# Patient Record
Sex: MALE | Race: WHITE | NOT HISPANIC OR LATINO | Employment: FULL TIME | URBAN - METROPOLITAN AREA
[De-identification: names, ages, dates, MRNs, and addresses within clinical notes are randomized per-mention and may not be internally consistent; named-entity substitution may affect disease eponyms.]

---

## 2018-01-30 ENCOUNTER — OFFICE VISIT (OUTPATIENT)
Dept: RETAIL CLINIC | Facility: CLINIC | Age: 48
End: 2018-01-30

## 2018-01-30 VITALS
HEART RATE: 79 BPM | OXYGEN SATURATION: 99 % | TEMPERATURE: 98.9 F | SYSTOLIC BLOOD PRESSURE: 110 MMHG | DIASTOLIC BLOOD PRESSURE: 72 MMHG | RESPIRATION RATE: 18 BRPM

## 2018-01-30 DIAGNOSIS — R50.9 FEVER, UNSPECIFIED FEVER CAUSE: ICD-10-CM

## 2018-01-30 DIAGNOSIS — R12 HEARTBURN: Primary | ICD-10-CM

## 2018-01-30 DIAGNOSIS — J06.9 ACUTE URI: ICD-10-CM

## 2018-01-30 LAB
EXPIRATION DATE: NORMAL
FLUAV AG NPH QL: NEGATIVE
FLUBV AG NPH QL: NEGATIVE
INTERNAL CONTROL: NORMAL
Lab: NORMAL

## 2018-01-30 PROCEDURE — 99203 OFFICE O/P NEW LOW 30 MIN: CPT | Performed by: NURSE PRACTITIONER

## 2018-01-30 PROCEDURE — 87804 INFLUENZA ASSAY W/OPTIC: CPT | Performed by: NURSE PRACTITIONER

## 2018-01-30 RX ORDER — OMEPRAZOLE 20 MG/1
20 CAPSULE, DELAYED RELEASE ORAL DAILY
Qty: 30 CAPSULE | Refills: 0 | Status: SHIPPED | OUTPATIENT
Start: 2018-01-30 | End: 2018-03-02

## 2018-01-30 NOTE — PATIENT INSTRUCTIONS
"Upper Respiratory Infection, Adult  Most upper respiratory infections (URIs) are a viral infection of the air passages leading to the lungs. A URI affects the nose, throat, and upper air passages. The most common type of URI is nasopharyngitis and is typically referred to as \"the common cold.\"  URIs run their course and usually go away on their own. Most of the time, a URI does not require medical attention, but sometimes a bacterial infection in the upper airways can follow a viral infection. This is called a secondary infection. Sinus and middle ear infections are common types of secondary upper respiratory infections.  Bacterial pneumonia can also complicate a URI. A URI can worsen asthma and chronic obstructive pulmonary disease (COPD). Sometimes, these complications can require emergency medical care and may be life threatening.  What are the causes?  Almost all URIs are caused by viruses. A virus is a type of germ and can spread from one person to another.  What increases the risk?  You may be at risk for a URI if:  · You smoke.  · You have chronic heart or lung disease.  · You have a weakened defense (immune) system.  · You are very young or very old.  · You have nasal allergies or asthma.  · You work in crowded or poorly ventilated areas.  · You work in health care facilities or schools.  What are the signs or symptoms?  Symptoms typically develop 2-3 days after you come in contact with a cold virus. Most viral URIs last 7-10 days. However, viral URIs from the influenza virus (flu virus) can last 14-18 days and are typically more severe. Symptoms may include:  · Runny or stuffy (congested) nose.  · Sneezing.  · Cough.  · Sore throat.  · Headache.  · Fatigue.  · Fever.  · Loss of appetite.  · Pain in your forehead, behind your eyes, and over your cheekbones (sinus pain).  · Muscle aches.  How is this diagnosed?  Your health care provider may diagnose a URI by:  · Physical exam.  · Tests to check that your " symptoms are not due to another condition such as:  ¨ Strep throat.  ¨ Sinusitis.  ¨ Pneumonia.  ¨ Asthma.  How is this treated?  A URI goes away on its own with time. It cannot be cured with medicines, but medicines may be prescribed or recommended to relieve symptoms. Medicines may help:  · Reduce your fever.  · Reduce your cough.  · Relieve nasal congestion.  Follow these instructions at home:  · Take medicines only as directed by your health care provider.  · Gargle warm saltwater or take cough drops to comfort your throat as directed by your health care provider.  · Use a warm mist humidifier or inhale steam from a shower to increase air moisture. This may make it easier to breathe.  · Drink enough fluid to keep your urine clear or pale yellow.  · Eat soups and other clear broths and maintain good nutrition.  · Rest as needed.  · Return to work when your temperature has returned to normal or as your health care provider advises. You may need to stay home longer to avoid infecting others. You can also use a face mask and careful hand washing to prevent spread of the virus.  · Increase the usage of your inhaler if you have asthma.  · Do not use any tobacco products, including cigarettes, chewing tobacco, or electronic cigarettes. If you need help quitting, ask your health care provider.  How is this prevented?  The best way to protect yourself from getting a cold is to practice good hygiene.  · Avoid oral or hand contact with people with cold symptoms.  · Wash your hands often if contact occurs.  There is no clear evidence that vitamin C, vitamin E, echinacea, or exercise reduces the chance of developing a cold. However, it is always recommended to get plenty of rest, exercise, and practice good nutrition.  Contact a health care provider if:  · You are getting worse rather than better.  · Your symptoms are not controlled by medicine.  · You have chills.  · You have worsening shortness of breath.  · You have brown  or red mucus.  · You have yellow or brown nasal discharge.  · You have pain in your face, especially when you bend forward.  · You have a fever.  · You have swollen neck glands.  · You have pain while swallowing.  · You have white areas in the back of your throat.  Get help right away if:  · You have severe or persistent:  ¨ Headache.  ¨ Ear pain.  ¨ Sinus pain.  ¨ Chest pain.  · You have chronic lung disease and any of the following:  ¨ Wheezing.  ¨ Prolonged cough.  ¨ Coughing up blood.  ¨ A change in your usual mucus.  · You have a stiff neck.  · You have changes in your:  ¨ Vision.  ¨ Hearing.  ¨ Thinking.  ¨ Mood.  This information is not intended to replace advice given to you by your health care provider. Make sure you discuss any questions you have with your health care provider.  Document Released: 06/13/2002 Document Revised: 08/20/2017 Document Reviewed: 03/25/2015  [employer] Patient Education © 2017 [employer]    Heartburn  Heartburn is a type of pain or discomfort that can happen in the throat or chest. It is often described as a burning pain. It may also cause a bad taste in the mouth. Heartburn may feel worse when you lie down or bend over, and it is often worse at night. Heartburn may be caused by stomach contents that move back up into the esophagus (reflux).  Follow these instructions at home:  Take these actions to decrease your discomfort and to help avoid complications.  Diet  · Follow a diet as recommended by your health care provider. This may involve avoiding foods and drinks such as:  ¨ Coffee and tea (with or without caffeine).  ¨ Drinks that contain alcohol.  ¨ Energy drinks and sports drinks.  ¨ Carbonated drinks or sodas.  ¨ Chocolate and cocoa.  ¨ Peppermint and mint flavorings.  ¨ Garlic and onions.  ¨ Horseradish.  ¨ Spicy and acidic foods, including peppers, chili powder, garcia powder, vinegar, hot sauces, and barbecue sauce.  ¨ Citrus fruit juices and citrus fruits,  such as oranges, dwain, and limes.  ¨ Tomato-based foods, such as red sauce, chili, salsa, and pizza with red sauce.  ¨ Fried and fatty foods, such as donuts, french fries, potato chips, and high-fat dressings.  ¨ High-fat meats, such as hot dogs and fatty cuts of red and white meats, such as rib eye steak, sausage, ham, and kraus.  ¨ High-fat dairy items, such as whole milk, butter, and cream cheese.  · Eat small, frequent meals instead of large meals.  · Avoid drinking large amounts of liquid with your meals.  · Avoid eating meals during the 2-3 hours before bedtime.  · Avoid lying down right after you eat.  · Do not exercise right after you eat.  General instructions  · Pay attention to any changes in your symptoms.  · Take over-the-counter and prescription medicines only as told by your health care provider. Do not take aspirin, ibuprofen, or other NSAIDs unless your health care provider told you to do so.  · Do not use any tobacco products, including cigarettes, chewing tobacco, and e-cigarettes. If you need help quitting, ask your health care provider.  · Wear loose-fitting clothing. Do not wear anything tight around your waist that causes pressure on your abdomen.  · Raise (elevate) the head of your bed about 6 inches (15 cm).  · Try to reduce your stress, such as with yoga or meditation. If you need help reducing stress, ask your health care provider.  · If you are overweight, reduce your weight to an amount that is healthy for you. Ask your health care provider for guidance about a safe weight loss goal.  · Keep all follow-up visits as told by your health care provider. This is important.  Contact a health care provider if:  · You have new symptoms.  · You have unexplained weight loss.  · You have difficulty swallowing, or it hurts to swallow.  · You have wheezing or a persistent cough.  · Your symptoms do not improve with treatment.  · You have frequent heartburn for more than two weeks.  Get help right  away if:  · You have pain in your arms, neck, jaw, teeth, or back.  · You feel sweaty, dizzy, or light-headed.  · You have chest pain or shortness of breath.  · You vomit and your vomit looks like blood or coffee grounds.  · Your stool is bloody or black.  This information is not intended to replace advice given to you by your health care provider. Make sure you discuss any questions you have with your health care provider.  Document Released: 05/06/2010 Document Revised: 05/25/2017 Document Reviewed: 04/13/2016  [employer] Interactive Patient Education © 2017 [employer]

## 2018-01-30 NOTE — PROGRESS NOTES
"Subjective   Naveed Sauceda is a 47 y.o. male.     HPI Comments: Has an appointment with PCP on 2/2/18 for the chronic cough and palpitations that he has been having over the past 2 months. He reports a history of reflux which worsens at night and sometimes requires him to prop himself up in bed at night which helps sx. He has intermittently taken omeprazole in 2 week courses which resolves sx but he has never tapered off and reports reflux sx returning shortly after 2 week course. He reports palpitations also keep him up at night and prior to this happening, he feels chest \"spasms\". He denies chest/jaw/arm pain, HA, diaphoresis, or dizziness. His acute complaints involve worsening cough, fever, aches, and red eyes over the last 3 days. His wife was diagnosed with influenza 2 days ago but is feeling better today. He didn't receive influenza vaccination this season.     Cough   This is a chronic problem. Episode onset: 2 months ago. The problem has been unchanged. The problem occurs hourly. The cough is non-productive. Associated symptoms include eye redness, a fever (tmax 101.3), headaches, heartburn, myalgias and a sore throat (intermittently over past 2 months). Pertinent negatives include no chest pain, chills, ear congestion, ear pain, hemoptysis, nasal congestion, postnasal drip, rhinorrhea, shortness of breath, sweats, weight loss or wheezing. The symptoms are aggravated by lying down. Treatments tried: ibuprofen, omeprazole. The treatment provided mild relief. His past medical history is significant for environmental allergies. There is no history of asthma, bronchitis, COPD or pneumonia.       The following portions of the patient's history were reviewed and updated as appropriate: allergies, current medications, past family history, past medical history, past social history, past surgical history and problem list.    Review of Systems   Constitutional: Positive for fatigue and fever (tmax 101.3). Negative " for appetite change, chills, diaphoresis and weight loss.   HENT: Positive for sore throat (intermittently over past 2 months). Negative for congestion, ear discharge, ear pain, facial swelling, hearing loss, mouth sores, nosebleeds, postnasal drip, rhinorrhea, sinus pain, sinus pressure, sneezing, trouble swallowing and voice change.    Eyes: Positive for discharge and redness. Negative for photophobia, pain, itching and visual disturbance.   Respiratory: Positive for cough. Negative for hemoptysis, chest tightness, shortness of breath, wheezing and stridor.    Cardiovascular: Positive for palpitations. Negative for chest pain and leg swelling.   Gastrointestinal: Positive for heartburn. Negative for abdominal distention, abdominal pain, blood in stool, constipation, diarrhea, nausea and vomiting.   Genitourinary: Negative for decreased urine volume.   Musculoskeletal: Positive for myalgias. Negative for neck pain and neck stiffness.   Skin: Negative for color change.   Allergic/Immunologic: Positive for environmental allergies. Negative for food allergies and immunocompromised state.   Neurological: Positive for headaches. Negative for dizziness, syncope and weakness.       Objective   Physical Exam   Constitutional: He is oriented to person, place, and time. He appears well-developed and well-nourished. He is cooperative.  Non-toxic appearance. He does not appear ill. No distress.   HENT:   Right Ear: Hearing, external ear and ear canal normal. Tympanic membrane is not perforated, not erythematous, not retracted and not bulging. A middle ear effusion is present.   Left Ear: Hearing, external ear and ear canal normal. Tympanic membrane is not perforated, not erythematous, not retracted and not bulging. A middle ear effusion is present.   Nose: Nose normal. No mucosal edema or rhinorrhea. Right sinus exhibits no maxillary sinus tenderness and no frontal sinus tenderness. Left sinus exhibits no maxillary sinus  tenderness and no frontal sinus tenderness.   Mouth/Throat: Uvula is midline, oropharynx is clear and moist and mucous membranes are normal. No oral lesions. No uvula swelling. No oropharyngeal exudate, posterior oropharyngeal edema or posterior oropharyngeal erythema. Tonsils are 2+ on the right. Tonsils are 2+ on the left. No tonsillar exudate.   Small amount of non obstructing, soft, yellow cerumen in bilateral canals   Eyes: EOM and lids are normal. Pupils are equal, round, and reactive to light. Right eye exhibits no discharge and no hordeolum. No foreign body present in the right eye. Left eye exhibits discharge (small amount in inner canthus). Left eye exhibits no hordeolum. No foreign body present in the left eye. Right conjunctiva is injected. Right conjunctiva has no hemorrhage. Left conjunctiva is injected. Left conjunctiva has no hemorrhage.   Cardiovascular: Normal rate, regular rhythm, S1 normal and S2 normal.    Pulmonary/Chest: Effort normal and breath sounds normal.   Abdominal: Bowel sounds are normal. There is no tenderness.   Lymphadenopathy:     He has no cervical adenopathy.   Neurological: He is alert and oriented to person, place, and time.   Skin: Skin is warm and dry. He is not diaphoretic.   Vitals reviewed.      Assessment/Plan   Naveed was seen today for cough.    Diagnoses and all orders for this visit:    Heartburn  -     omeprazole (PRILOSEC) 20 MG capsule; Take 1 capsule by mouth Daily for 30 days.    Fever, unspecified fever cause  -     POC Influenza A / B    Acute URI        -     Symptomatic care: rest, increase H2O intake, visine OTC for red eyes        -     Discussed food diary        -     Follow up with PCP on 2/2/18 at previously scheduled appointment-discussed emergency symptoms requiring follow up at ER    Lab Results (last 24 hours)     Procedure Component Value Units Date/Time    POC Influenza A / B [[id number]] Collected:  01/30/18 0842    Specimen:  Swab Updated:   01/30/18 0843     Rapid Influenza A Ag negative     Rapid Influenza B Ag negative     Internal Control Passed     Lot Number [id number]     Expiration Date 12/2019

## 2018-02-02 ENCOUNTER — OFFICE VISIT (OUTPATIENT)
Dept: FAMILY MEDICINE CLINIC | Facility: CLINIC | Age: 48
End: 2018-02-02

## 2018-02-02 VITALS
HEART RATE: 81 BPM | SYSTOLIC BLOOD PRESSURE: 118 MMHG | DIASTOLIC BLOOD PRESSURE: 68 MMHG | RESPIRATION RATE: 16 BRPM | BODY MASS INDEX: 25.76 KG/M2 | OXYGEN SATURATION: 99 % | WEIGHT: 184 LBS | HEIGHT: 71 IN | TEMPERATURE: 98.3 F

## 2018-02-02 DIAGNOSIS — R06.02 SHORTNESS OF BREATH: ICD-10-CM

## 2018-02-02 DIAGNOSIS — J06.9 ACUTE URI: ICD-10-CM

## 2018-02-02 DIAGNOSIS — R79.89 ABNORMAL THYROID BLOOD TEST: ICD-10-CM

## 2018-02-02 DIAGNOSIS — G47.30 OBSERVED SLEEP APNEA: ICD-10-CM

## 2018-02-02 DIAGNOSIS — R00.2 PALPITATIONS: ICD-10-CM

## 2018-02-02 DIAGNOSIS — R53.83 TIRED: Primary | ICD-10-CM

## 2018-02-02 DIAGNOSIS — R05.9 COUGH: ICD-10-CM

## 2018-02-02 DIAGNOSIS — D72.9 ABNORMAL WBC COUNT: ICD-10-CM

## 2018-02-02 DIAGNOSIS — R79.89 LFT ELEVATION: ICD-10-CM

## 2018-02-02 DIAGNOSIS — Z00.00 LABORATORY EXAMINATION ORDERED AS PART OF A ROUTINE GENERAL MEDICAL EXAMINATION: ICD-10-CM

## 2018-02-02 PROBLEM — K21.9 ACID REFLUX: Status: ACTIVE | Noted: 2018-02-02

## 2018-02-02 PROCEDURE — 99204 OFFICE O/P NEW MOD 45 MIN: CPT | Performed by: PHYSICIAN ASSISTANT

## 2018-02-02 PROCEDURE — 71046 X-RAY EXAM CHEST 2 VIEWS: CPT | Performed by: PHYSICIAN ASSISTANT

## 2018-02-02 RX ORDER — AZITHROMYCIN 250 MG/1
TABLET, FILM COATED ORAL
Qty: 6 TABLET | Refills: 1 | Status: SHIPPED | OUTPATIENT
Start: 2018-02-02 | End: 2018-02-13 | Stop reason: ALTCHOICE

## 2018-02-02 NOTE — PATIENT INSTRUCTIONS

## 2018-02-02 NOTE — PROGRESS NOTES
Subjective   Naveed Sauceda is a 47 y.o. male.     History of Present Illness     Naveed Sauceda 47 y.o. male who presents today for a new patient appointment.    he has a history of   Patient Active Problem List   Diagnosis   • Acid reflux   .  he is here to re-establish care I reviewed the PFSH recorded today by my MA/LPN staff.   he   He has been feeling fairly well.    X-Ray  Interpretation report in house X-rays that I personally viewed    Relevant Clinical Issues/Diagnoses/Indications:  Cough for months        Clinical Findings:  Calcifications in aorta X 2; DDD changes spine          Comparative Data:  none           Date of Previous X-ray:  Change on current X-ray    Cough non productive since Nov; dry cough;  Worse laying down and NOT worse exerting;  Some exertional SOA as well;  Productive cough with illness onset for over 1 1/2 weeks  He did restart come to [facility] and had him restart PPI and see if causing cough.  Plan CXR, labs, cardio consult  Has few times a week of waking up with gasping for air and will need sleep study  Palpitations do not wake him up  Will elevated HOB    Patient complains of palpitations, shortness of breath and a skipping sensation. The symptoms are mild, occur intermittently and every hour for last few months; not worse; no coupled beats that he can feel.   and last seconds per episode. They tend to occur while at rest. Cardiac risk factors include: male gender. Aggravating factors are stress/anxiety:  Alleviating factors: not noted and just goes away. Associated symptoms: none. Patient denies: dizziness and leg swelling.      Will avoid caffeine and decongestants   The following portions of the patient's history were reviewed and updated as appropriate: allergies, current medications, past family history, past medical history, past social history, past surgical history and problem list.    Review of Systems   Constitutional: Positive for fatigue. Negative for activity change,  appetite change and unexpected weight change.   HENT: Positive for congestion, postnasal drip and voice change. Negative for nosebleeds and trouble swallowing.    Eyes: Positive for redness. Negative for pain and visual disturbance.   Respiratory: Negative for chest tightness, shortness of breath and wheezing.    Cardiovascular: Positive for palpitations. Negative for chest pain.   Gastrointestinal: Negative for abdominal pain and blood in stool.   Endocrine: Negative.    Genitourinary: Negative for difficulty urinating and hematuria.   Musculoskeletal: Negative for joint swelling.   Skin: Negative for color change and rash.   Allergic/Immunologic: Negative.    Neurological: Negative for syncope and speech difficulty.   Hematological: Negative for adenopathy.   Psychiatric/Behavioral: Negative for agitation and confusion.   All other systems reviewed and are negative.      Objective   Physical Exam   Constitutional: He is oriented to person, place, and time. He appears well-developed and well-nourished. No distress.   HENT:   Head: Normocephalic and atraumatic.   Eyes: Conjunctivae and EOM are normal. Pupils are equal, round, and reactive to light. Right eye exhibits no discharge. Left eye exhibits no discharge. No scleral icterus.   Neck: Normal range of motion. Neck supple. No tracheal deviation present. No thyromegaly present.   Cardiovascular: Normal rate, regular rhythm, normal heart sounds, intact distal pulses and normal pulses.  Exam reveals no gallop.    No murmur heard.  Pulmonary/Chest: Effort normal and breath sounds normal. No respiratory distress. He has no wheezes. He has no rales.   Musculoskeletal: Normal range of motion.   Neurological: He is alert and oriented to person, place, and time. He exhibits normal muscle tone. Coordination normal.   Skin: Skin is warm. No rash noted. No erythema. No pallor.   Psychiatric: He has a normal mood and affect. His behavior is normal. Judgment and thought  content normal.   Nursing note and vitals reviewed.      Assessment/Plan   Naveed was seen today for establish care.    Diagnoses and all orders for this visit:    Tired    Palpitations    Shortness of breath    Observed sleep apnea

## 2018-02-03 LAB
25(OH)D3+25(OH)D2 SERPL-MCNC: 41.3 NG/ML (ref 30–100)
ALBUMIN SERPL-MCNC: 4.4 G/DL (ref 3.5–5.2)
ALBUMIN/GLOB SERPL: 1.4 G/DL
ALP SERPL-CCNC: 129 U/L (ref 39–117)
ALT SERPL-CCNC: 28 U/L (ref 1–41)
AST SERPL-CCNC: 21 U/L (ref 1–40)
BASOPHILS # BLD AUTO: 0.06 10*3/MM3 (ref 0–0.2)
BASOPHILS NFR BLD AUTO: 0.5 % (ref 0–1.5)
BILIRUB SERPL-MCNC: 0.2 MG/DL (ref 0.1–1.2)
BUN SERPL-MCNC: 15 MG/DL (ref 6–20)
BUN/CREAT SERPL: 15.2 (ref 7–25)
CALCIUM SERPL-MCNC: 9.6 MG/DL (ref 8.6–10.5)
CHLORIDE SERPL-SCNC: 102 MMOL/L (ref 98–107)
CHOLEST SERPL-MCNC: 144 MG/DL (ref 0–200)
CO2 SERPL-SCNC: 26.4 MMOL/L (ref 22–29)
CREAT SERPL-MCNC: 0.99 MG/DL (ref 0.76–1.27)
EOSINOPHIL # BLD AUTO: 0.23 10*3/MM3 (ref 0–0.7)
EOSINOPHIL NFR BLD AUTO: 2.1 % (ref 0.3–6.2)
ERYTHROCYTE [DISTWIDTH] IN BLOOD BY AUTOMATED COUNT: 12.6 % (ref 11.5–14.5)
GFR SERPLBLD CREATININE-BSD FMLA CKD-EPI: 81 ML/MIN/1.73
GFR SERPLBLD CREATININE-BSD FMLA CKD-EPI: 98 ML/MIN/1.73
GLOBULIN SER CALC-MCNC: 3.2 GM/DL
GLUCOSE SERPL-MCNC: 94 MG/DL (ref 65–99)
HCT VFR BLD AUTO: 49.2 % (ref 40.4–52.2)
HDLC SERPL-MCNC: 42 MG/DL (ref 40–60)
HGB BLD-MCNC: 16.7 G/DL (ref 13.7–17.6)
IMM GRANULOCYTES # BLD: 0.05 10*3/MM3 (ref 0–0.03)
IMM GRANULOCYTES NFR BLD: 0.4 % (ref 0–0.5)
LDLC SERPL CALC-MCNC: 89 MG/DL (ref 0–100)
LYMPHOCYTES # BLD AUTO: 2.45 10*3/MM3 (ref 0.9–4.8)
LYMPHOCYTES NFR BLD AUTO: 22 % (ref 19.6–45.3)
MCH RBC QN AUTO: 31.6 PG (ref 27–32.7)
MCHC RBC AUTO-ENTMCNC: 33.9 G/DL (ref 32.6–36.4)
MCV RBC AUTO: 93 FL (ref 79.8–96.2)
MONOCYTES # BLD AUTO: 1.26 10*3/MM3 (ref 0.2–1.2)
MONOCYTES NFR BLD AUTO: 11.3 % (ref 5–12)
NEUTROPHILS # BLD AUTO: 7.07 10*3/MM3 (ref 1.9–8.1)
NEUTROPHILS NFR BLD AUTO: 63.7 % (ref 42.7–76)
PLATELET # BLD AUTO: 287 10*3/MM3 (ref 140–500)
POTASSIUM SERPL-SCNC: 4.4 MMOL/L (ref 3.5–5.2)
PROT SERPL-MCNC: 7.6 G/DL (ref 6–8.5)
RBC # BLD AUTO: 5.29 10*6/MM3 (ref 4.6–6)
SODIUM SERPL-SCNC: 143 MMOL/L (ref 136–145)
T3FREE SERPL-MCNC: 4.6 PG/ML (ref 2–4.4)
T4 FREE SERPL-MCNC: 1.44 NG/DL (ref 0.93–1.7)
TRIGL SERPL-MCNC: 63 MG/DL (ref 0–150)
TSH SERPL DL<=0.005 MIU/L-ACNC: 1.09 MIU/ML (ref 0.27–4.2)
VLDLC SERPL CALC-MCNC: 12.6 MG/DL (ref 5–40)
WBC # BLD AUTO: 11.12 10*3/MM3 (ref 4.5–10.7)

## 2018-02-13 ENCOUNTER — OFFICE VISIT (OUTPATIENT)
Dept: CARDIOLOGY | Facility: CLINIC | Age: 48
End: 2018-02-13

## 2018-02-13 VITALS
SYSTOLIC BLOOD PRESSURE: 122 MMHG | DIASTOLIC BLOOD PRESSURE: 80 MMHG | HEART RATE: 83 BPM | WEIGHT: 162 LBS | HEIGHT: 71 IN | BODY MASS INDEX: 22.68 KG/M2

## 2018-02-13 DIAGNOSIS — R00.2 PALPITATIONS: Primary | ICD-10-CM

## 2018-02-13 DIAGNOSIS — R06.02 SHORTNESS OF BREATH: ICD-10-CM

## 2018-02-13 DIAGNOSIS — I45.10 RBBB: ICD-10-CM

## 2018-02-13 PROCEDURE — 93000 ELECTROCARDIOGRAM COMPLETE: CPT | Performed by: INTERNAL MEDICINE

## 2018-02-13 PROCEDURE — 99204 OFFICE O/P NEW MOD 45 MIN: CPT | Performed by: INTERNAL MEDICINE

## 2018-02-13 NOTE — PROGRESS NOTES
Subjective:     Encounter Date:02/13/2018      Patient ID: Naveed Sauceda is a 47 y.o. male.    Chief Complaint:  History of Present Illness    This is a 47-year-old with no significant past medical history presents for evaluation of palpitations.  Patient reports that he notices symptoms about 6 months ago.  The frequency of the symptoms has been pretty stable since that time.  He describes it as skipped heartbeats occurring usually about one at a time but occurs on a daily basis sometimes several times a day.  Symptoms can worsen with increased stress.  He does not arrest report any other associated aggravating factors.  He denies any worsening palpitations with exercise.  He denies any chest pain, PND or orthopnea, presyncope or syncope, or lower extremity edema.  He does report over that same period of time that is noticed that he is a little bit more short of breath with his routine activity at work.  There is some concern for sleep apnea and he is scheduled to undergo a sleep study next week.  He denies any family history of cardiac disease.    Review of Systems   Constitution: Negative for weakness and malaise/fatigue.   HENT: Negative for hearing loss, hoarse voice, nosebleeds and sore throat.    Eyes: Negative for pain.   Cardiovascular: Positive for dyspnea on exertion and palpitations. Negative for chest pain, claudication, cyanosis, irregular heartbeat, leg swelling, near-syncope, orthopnea, paroxysmal nocturnal dyspnea and syncope.   Respiratory: Negative for shortness of breath and snoring.    Endocrine: Negative for cold intolerance, heat intolerance, polydipsia, polyphagia and polyuria.   Skin: Negative for itching and rash.   Musculoskeletal: Negative for arthritis, falls, joint pain, joint swelling, muscle cramps, muscle weakness and myalgias.   Gastrointestinal: Negative for constipation, diarrhea, dysphagia, heartburn, hematemesis, hematochezia, melena, nausea and vomiting.   Genitourinary:  "Negative for frequency, hematuria and hesitancy.   Neurological: Negative for excessive daytime sleepiness, dizziness, headaches, light-headedness and numbness.   Psychiatric/Behavioral: Negative for depression. The patient is not nervous/anxious.           Current Outpatient Prescriptions:   •  omeprazole (PRILOSEC) 20 MG capsule, Take 1 capsule by mouth Daily for 30 days., Disp: 30 capsule, Rfl: 0    Past Medical History:   Diagnosis Date   • Acid reflux    • Allergic    • Kidney stone    • Observed sleep apnea    • Palpitations    • Shortness of breath    • Tired      Past Surgical History:   Procedure Laterality Date   • CYSTOSCOPY W/ LASER LITHOTRIPSY       History reviewed. No pertinent family history.  Social History   Substance Use Topics   • Smoking status: Never Smoker   • Smokeless tobacco: Never Used   • Alcohol use Yes      Comment: rarely           ECG 12 Lead  Date/Time: 2/13/2018 10:35 AM  Performed by: HANDY RUBIO  Authorized by: HANDY RUBIO   Comparison: not compared with previous ECG   Previous ECG: no previous ECG available  Rhythm: sinus rhythm  Conduction: right bundle branch block  QRS axis: left                 Objective:         Visit Vitals   • /80 (BP Location: Right arm, Patient Position: Sitting)   • Pulse 83   • Ht 180.3 cm (71\")   • Wt 73.5 kg (162 lb)   • BMI 22.59 kg/m2          Physical Exam   Constitutional: He is oriented to person, place, and time. He appears well-developed and well-nourished.   HENT:   Head: Normocephalic and atraumatic.   Eyes: Conjunctivae, EOM and lids are normal. Pupils are equal, round, and reactive to light.   Neck: Normal range of motion and full passive range of motion without pain. Neck supple. No JVD present. Carotid bruit is not present.   Cardiovascular: Normal rate, regular rhythm, S1 normal and S2 normal.  Exam reveals no gallop.    No murmur heard.  Pulses:       Radial pulses are 2+ on the right side, and 2+ on the left side.   No " bilateral lower extremity edema   Pulmonary/Chest: Effort normal and breath sounds normal.   Abdominal: Soft. Normal appearance.   Lymphadenopathy:     He has no cervical adenopathy.   Neurological: He is alert and oriented to person, place, and time.   Skin: Skin is warm, dry and intact.   Psychiatric: He has a normal mood and affect.       Lab Review:       Assessment:          Diagnosis Plan   1. Palpitations  Holter Monitor - 24 Hour    Adult Transthoracic Echo Complete W/ Cont if Necessary Per Protocol   2. Shortness of breath  Adult Transthoracic Echo Complete W/ Cont if Necessary Per Protocol   3. RBBB            Plan:         1.  Palpitations.  His symptoms sound suspicious for ectopy either ventricular or atrial.  We'll get him set up for a 24-hour Holter monitor since his symptoms occur on a daily basis.  Due to his symptoms, some mild dyspnea on exertion, and right bundle branch block noted on his baseline EKG we'll get him set up for an echocardiogram to evaluate for structural heart disease.  2.  Suspected sleep apnea.  Agree with proceeding with sleep study since this could be causing his palpitations and could be responsible for his right bundle branch block.  3.  Right bundle branch block    We'll contact us results of Holter monitor and the echocardiogram and determine further workup, management, and follow-up based on those results.

## 2018-02-15 NOTE — PROGRESS NOTES
Called and discussed holter monitor results with patient.  He would like hold off on echo for now.  Has sleep study next week.  He will call if he has further issues.

## 2018-02-19 ENCOUNTER — TRANSCRIBE ORDERS (OUTPATIENT)
Dept: SLEEP MEDICINE | Facility: HOSPITAL | Age: 48
End: 2018-02-19

## 2018-02-19 ENCOUNTER — OFFICE VISIT (OUTPATIENT)
Dept: SLEEP MEDICINE | Facility: HOSPITAL | Age: 48
End: 2018-02-19
Attending: INTERNAL MEDICINE

## 2018-02-19 VITALS
BODY MASS INDEX: 22.96 KG/M2 | DIASTOLIC BLOOD PRESSURE: 67 MMHG | SYSTOLIC BLOOD PRESSURE: 117 MMHG | WEIGHT: 164 LBS | HEIGHT: 71 IN | HEART RATE: 81 BPM

## 2018-02-19 DIAGNOSIS — G47.10 HYPERSOMNIA: Primary | ICD-10-CM

## 2018-02-19 DIAGNOSIS — G47.33 OSA (OBSTRUCTIVE SLEEP APNEA): Primary | ICD-10-CM

## 2018-02-19 PROCEDURE — G0463 HOSPITAL OUTPT CLINIC VISIT: HCPCS

## 2018-03-02 ENCOUNTER — OFFICE VISIT (OUTPATIENT)
Dept: FAMILY MEDICINE CLINIC | Facility: CLINIC | Age: 48
End: 2018-03-02

## 2018-03-02 VITALS
BODY MASS INDEX: 23.1 KG/M2 | TEMPERATURE: 98.4 F | OXYGEN SATURATION: 99 % | WEIGHT: 165 LBS | HEIGHT: 71 IN | HEART RATE: 82 BPM | RESPIRATION RATE: 16 BRPM | SYSTOLIC BLOOD PRESSURE: 110 MMHG | DIASTOLIC BLOOD PRESSURE: 70 MMHG

## 2018-03-02 DIAGNOSIS — K21.9 GASTROESOPHAGEAL REFLUX DISEASE WITHOUT ESOPHAGITIS: ICD-10-CM

## 2018-03-02 DIAGNOSIS — R53.83 TIRED: ICD-10-CM

## 2018-03-02 DIAGNOSIS — R06.02 SHORTNESS OF BREATH: ICD-10-CM

## 2018-03-02 DIAGNOSIS — D72.829 LEUKOCYTOSIS, UNSPECIFIED TYPE: ICD-10-CM

## 2018-03-02 DIAGNOSIS — R79.89 LFT ELEVATION: Primary | ICD-10-CM

## 2018-03-02 DIAGNOSIS — R79.89 ABNORMAL THYROID BLOOD TEST: ICD-10-CM

## 2018-03-02 PROCEDURE — 99213 OFFICE O/P EST LOW 20 MIN: CPT | Performed by: PHYSICIAN ASSISTANT

## 2018-03-02 NOTE — PATIENT INSTRUCTIONS
F/u allergist  Get sleep study  Get labs  Low glycemic index diet  Exercise 30 minutes most days of the week  Make sure you get results on any labs or tests we ordered today  We discussed medications and how to take them as prescribed  Sleep 6-8 hours each night if possible  If you have not signed up for [employer], please activate your code ASAP from your After Visit Summary today    LDL goal <100  LDL goal if heart disease <70  HDL goal >60  Triglyceride goal <150  BP goal =<130/80  Fasting glucose <100

## 2018-03-02 NOTE — PROGRESS NOTES
Subjective   Naveed Sauceda is a 47 y.o. male.     History of Present Illness   Naveed Sauceda 47 y.o. male who presents today for f/u from last visit for cough, fatigue, exertional SOA, sensation of palpitations.  Had him see cardio and neg work up with DR Anderson.  He is seeing Dr Neil for allergy; and did cough variant asthma;  He is still on PPI and in steroid inhaler now.  Cough is 50% better;  Sleep study is in process.  he has a history of   Patient Active Problem List   Diagnosis   • Acid reflux   • Palpitations   • Shortness of breath   • RBBB   .    PPI is effective    Had CXR and d/w Dr Mendieta with one view discoid atelectasis; no mass    Naveed Sauceda 47 y.o. male who presents today for routine follow up check and medication refills.  he has a history of   Patient Active Problem List   Diagnosis   • Acid reflux   • Palpitations   • Shortness of breath   • RBBB   .  Since the last visit, he has overall felt tired.  He has GERD and is well controlled on PPI medication.  he has been compliant with current medications have reviewed them.  The patient denies medication side effects.  He still has sensation of extra heart beat and this can be sleep apnea r/t;  Dr Anderson did work up and neg    Results for orders placed or performed in visit on 02/02/18   Comprehensive metabolic panel   Result Value Ref Range    Glucose 94 65 - 99 mg/dL    BUN 15 6 - 20 mg/dL    Creatinine 0.99 0.76 - 1.27 mg/dL    eGFR Non African Am 81 >60 mL/min/1.73    eGFR African Am 98 >60 mL/min/1.73    BUN/Creatinine Ratio 15.2 7.0 - 25.0    Sodium 143 136 - 145 mmol/L    Potassium 4.4 3.5 - 5.2 mmol/L    Chloride 102 98 - 107 mmol/L    Total CO2 26.4 22.0 - 29.0 mmol/L    Calcium 9.6 8.6 - 10.5 mg/dL    Total Protein 7.6 6.0 - 8.5 g/dL    Albumin 4.40 3.50 - 5.20 g/dL    Globulin 3.2 gm/dL    A/G Ratio 1.4 g/dL    Total Bilirubin 0.2 0.1 - 1.2 mg/dL    Alkaline Phosphatase 129 (H) 39 - 117 U/L    AST (SGOT) 21 1 - 40 U/L    ALT (SGPT) 28 1  - 41 U/L   Lipid panel   Result Value Ref Range    Total Cholesterol 144 0 - 200 mg/dL    Triglycerides 63 0 - 150 mg/dL    HDL Cholesterol 42 40 - 60 mg/dL    VLDL Cholesterol 12.6 5 - 40 mg/dL    LDL Cholesterol  89 0 - 100 mg/dL   TSH   Result Value Ref Range    TSH 1.090 0.270 - 4.200 mIU/mL   T4, Free   Result Value Ref Range    Free T4 1.44 0.93 - 1.70 ng/dL   Vitamin D 25 Hydroxy   Result Value Ref Range    25 Hydroxy, Vitamin D 41.3 30.0 - 100.0 ng/mL   T3, Free   Result Value Ref Range    T3, Free 4.6 (H) 2.0 - 4.4 pg/mL   CBC and Differential   Result Value Ref Range    WBC 11.12 (H) 4.50 - 10.70 10*3/mm3    RBC 5.29 4.60 - 6.00 10*6/mm3    Hemoglobin 16.7 13.7 - 17.6 g/dL    Hematocrit 49.2 40.4 - 52.2 %    MCV 93.0 79.8 - 96.2 fL    MCH 31.6 27.0 - 32.7 pg    MCHC 33.9 32.6 - 36.4 g/dL    RDW 12.6 11.5 - 14.5 %    Platelets 287 140 - 500 10*3/mm3    Neutrophil Rel % 63.7 42.7 - 76.0 %    Lymphocyte Rel % 22.0 19.6 - 45.3 %    Monocyte Rel % 11.3 5.0 - 12.0 %    Eosinophil Rel % 2.1 0.3 - 6.2 %    Basophil Rel % 0.5 0.0 - 1.5 %    Neutrophils Absolute 7.07 1.90 - 8.10 10*3/mm3    Lymphocytes Absolute 2.45 0.90 - 4.80 10*3/mm3    Monocytes Absolute 1.26 (H) 0.20 - 1.20 10*3/mm3    Eosinophils Absolute 0.23 0.00 - 0.70 10*3/mm3    Basophils Absolute 0.06 0.00 - 0.20 10*3/mm3    Immature Granulocyte Rel % 0.4 0.0 - 0.5 %    Immature Grans Absolute 0.05 (H) 0.00 - 0.03 10*3/mm3       I need the f/u labs for the borderline free T 3; the alk phos, WBC in 11s    The following portions of the patient's history were reviewed and updated as appropriate: allergies, current medications, past family history, past medical history, past social history, past surgical history and problem list.    Review of Systems   Constitutional: Negative for activity change, appetite change and unexpected weight change.   HENT: Negative for nosebleeds and trouble swallowing.    Eyes: Negative for pain and visual disturbance.    Respiratory: Positive for cough. Negative for chest tightness, shortness of breath and wheezing.    Cardiovascular: Negative for chest pain and palpitations.   Gastrointestinal: Negative for abdominal pain and blood in stool.   Endocrine: Negative.    Genitourinary: Negative for difficulty urinating and hematuria.   Musculoskeletal: Negative for joint swelling.   Skin: Negative for color change and rash.   Allergic/Immunologic: Negative.    Neurological: Negative for syncope and speech difficulty.   Hematological: Negative for adenopathy.   Psychiatric/Behavioral: Negative for agitation and confusion.   All other systems reviewed and are negative.      Objective   Physical Exam   Constitutional: He is oriented to person, place, and time. He appears well-developed and well-nourished. No distress.   HENT:   Head: Normocephalic and atraumatic.   Eyes: Conjunctivae and EOM are normal. Pupils are equal, round, and reactive to light. Right eye exhibits no discharge. Left eye exhibits no discharge. No scleral icterus.   Neck: Normal range of motion. Neck supple. No tracheal deviation present. No thyromegaly present.   Cardiovascular: Normal rate, regular rhythm, normal heart sounds, intact distal pulses and normal pulses.  Exam reveals no gallop.    No murmur heard.  Pulmonary/Chest: Effort normal and breath sounds normal. No respiratory distress. He has no wheezes. He has no rales.   Musculoskeletal: Normal range of motion.   Neurological: He is alert and oriented to person, place, and time. He exhibits normal muscle tone. Coordination normal.   Skin: Skin is warm. No rash noted. No erythema. No pallor.   Psychiatric: He has a normal mood and affect. His behavior is normal. Judgment and thought content normal.   Nursing note and vitals reviewed.      Assessment/Plan   Naveed was seen today for follow-up and fatigue.    Diagnoses and all orders for this visit:    LFT elevation    Tired    Leukocytosis, unspecified  type    Gastroesophageal reflux disease without esophagitis    Abnormal thyroid blood test    Shortness of breath    Other orders  -     Fluticasone Furoate (ARNUITY ELLIPTA) 100 MCG/ACT aerosol powder ; Inhale 100 mcg Daily.

## 2018-03-03 LAB
ALBUMIN SERPL-MCNC: 4.5 G/DL (ref 3.5–5.2)
ALP SERPL-CCNC: 111 U/L (ref 39–117)
ALT SERPL-CCNC: 36 U/L (ref 1–41)
AST SERPL-CCNC: 18 U/L (ref 1–40)
BASOPHILS # BLD AUTO: 0.04 10*3/MM3 (ref 0–0.2)
BASOPHILS NFR BLD AUTO: 0.4 % (ref 0–1.5)
BILIRUB DIRECT SERPL-MCNC: <0.2 MG/DL (ref 0–0.3)
BILIRUB SERPL-MCNC: 0.2 MG/DL (ref 0.1–1.2)
EOSINOPHIL # BLD AUTO: 0.13 10*3/MM3 (ref 0–0.7)
EOSINOPHIL NFR BLD AUTO: 1.4 % (ref 0.3–6.2)
ERYTHROCYTE [DISTWIDTH] IN BLOOD BY AUTOMATED COUNT: 12.9 % (ref 11.5–14.5)
HCT VFR BLD AUTO: 49.5 % (ref 40.4–52.2)
HGB BLD-MCNC: 16.6 G/DL (ref 13.7–17.6)
IMM GRANULOCYTES # BLD: 0.02 10*3/MM3 (ref 0–0.03)
IMM GRANULOCYTES NFR BLD: 0.2 % (ref 0–0.5)
LYMPHOCYTES # BLD AUTO: 2.28 10*3/MM3 (ref 0.9–4.8)
LYMPHOCYTES NFR BLD AUTO: 25.4 % (ref 19.6–45.3)
MCH RBC QN AUTO: 31.4 PG (ref 27–32.7)
MCHC RBC AUTO-ENTMCNC: 33.5 G/DL (ref 32.6–36.4)
MCV RBC AUTO: 93.6 FL (ref 79.8–96.2)
MONOCYTES # BLD AUTO: 1.06 10*3/MM3 (ref 0.2–1.2)
MONOCYTES NFR BLD AUTO: 11.8 % (ref 5–12)
NEUTROPHILS # BLD AUTO: 5.45 10*3/MM3 (ref 1.9–8.1)
NEUTROPHILS NFR BLD AUTO: 60.8 % (ref 42.7–76)
PLATELET # BLD AUTO: 245 10*3/MM3 (ref 140–500)
RBC # BLD AUTO: 5.29 10*6/MM3 (ref 4.6–6)
T3FREE SERPL-MCNC: 4 PG/ML (ref 2–4.4)
WBC # BLD AUTO: 8.98 10*3/MM3 (ref 4.5–10.7)

## 2018-03-06 ENCOUNTER — HOSPITAL ENCOUNTER (OUTPATIENT)
Dept: SLEEP MEDICINE | Facility: HOSPITAL | Age: 48
Discharge: HOME | End: 2018-03-06
Admitting: PHYSICIAN ASSISTANT

## 2018-03-06 DIAGNOSIS — G47.33 OSA (OBSTRUCTIVE SLEEP APNEA): ICD-10-CM

## 2018-03-06 PROCEDURE — 95806 SLEEP STUDY UNATT&RESP EFFT: CPT

## 2018-03-14 RX ORDER — OMEPRAZOLE 20 MG/1
20 CAPSULE, DELAYED RELEASE ORAL DAILY
Qty: 90 CAPSULE | Refills: 3 | Status: SHIPPED | OUTPATIENT
Start: 2018-03-14 | End: 2022-05-05

## 2018-03-20 ENCOUNTER — TELEPHONE (OUTPATIENT)
Dept: SLEEP MEDICINE | Facility: HOSPITAL | Age: 48
End: 2018-03-20

## 2018-04-06 ENCOUNTER — OFFICE VISIT (OUTPATIENT)
Dept: FAMILY MEDICINE CLINIC | Facility: CLINIC | Age: 48
End: 2018-04-06

## 2018-04-06 VITALS
BODY MASS INDEX: 22.82 KG/M2 | HEART RATE: 77 BPM | SYSTOLIC BLOOD PRESSURE: 122 MMHG | HEIGHT: 71 IN | DIASTOLIC BLOOD PRESSURE: 74 MMHG | RESPIRATION RATE: 18 BRPM | TEMPERATURE: 99.3 F | OXYGEN SATURATION: 98 % | WEIGHT: 163 LBS

## 2018-04-06 DIAGNOSIS — S46.912A MUSCLE STRAIN OF LEFT UPPER ARM, INITIAL ENCOUNTER: Primary | ICD-10-CM

## 2018-04-06 PROCEDURE — 99213 OFFICE O/P EST LOW 20 MIN: CPT | Performed by: NURSE PRACTITIONER

## 2018-04-06 RX ORDER — PREDNISONE 20 MG/1
TABLET ORAL
Qty: 20 TABLET | Refills: 0 | Status: SHIPPED | OUTPATIENT
Start: 2018-04-06 | End: 2019-08-15

## 2018-04-06 NOTE — PROGRESS NOTES
Subjective   Naveed Sauceda is a 47 y.o. male.     History of Present Illness   Patient complains of left upper arm and forearm pain. The symptoms began 3 weeks ago.  Pain is a result of an injury while playing lifting furniture. Discomfort is described as aching and burning. Symptoms are exacerbated by lifting.  Evaluation to date: none. Therapy to date includes: OTC NSAIDs.        The following portions of the patient's history were reviewed and updated as appropriate: allergies, current medications, past family history, past medical history, past social history, past surgical history and problem list.    Review of Systems   Musculoskeletal: Positive for arthralgias. Negative for joint swelling, neck pain and neck stiffness.   Neurological: Positive for weakness. Negative for numbness.       Objective   Physical Exam   Constitutional: He is oriented to person, place, and time. He appears well-developed and well-nourished.   Pulmonary/Chest: Effort normal.   Musculoskeletal:        Left upper arm: He exhibits tenderness. He exhibits no bony tenderness, no swelling, no edema, no deformity and no laceration.        Arms:  Neurological: He is oriented to person, place, and time. No sensory deficit.   Reflex Scores:       Tricep reflexes are 1+ on the right side and 1+ on the left side.       Bicep reflexes are 1+ on the right side and 1+ on the left side.       Brachioradialis reflexes are 1+ on the right side and 1+ on the left side.  Skin: Skin is warm and dry.   Psychiatric: He has a normal mood and affect. His behavior is normal. Judgment and thought content normal.   Nursing note and vitals reviewed.      Assessment/Plan   Naveed was seen today for pulled muscle.    Diagnoses and all orders for this visit:    Muscle strain of left upper arm, initial encounter  -     predniSONE (DELTASONE) 20 MG tablet; Take 3 tabs QDPC x 3 days then 2 tabs QDPC x 4 days then 1 tab QDPC x 3 days

## 2018-04-09 ENCOUNTER — OFFICE VISIT (OUTPATIENT)
Dept: SLEEP MEDICINE | Facility: HOSPITAL | Age: 48
End: 2018-04-09
Attending: INTERNAL MEDICINE

## 2018-04-09 VITALS
DIASTOLIC BLOOD PRESSURE: 73 MMHG | BODY MASS INDEX: 23.66 KG/M2 | HEART RATE: 77 BPM | SYSTOLIC BLOOD PRESSURE: 121 MMHG | HEIGHT: 71 IN | WEIGHT: 169 LBS

## 2018-04-09 DIAGNOSIS — G47.10 HYPERSOMNIA: Primary | ICD-10-CM

## 2018-04-09 PROCEDURE — G0463 HOSPITAL OUTPT CLINIC VISIT: HCPCS

## 2018-11-22 NOTE — PROGRESS NOTES
11/22/18 0600   Sleep Analysis   Sleep/Wake Cycle Unremarkable   Hours Slept 10+  (asleep 1945)   Pt appeared to sleep soundly through the night and made no complaints to staff during rounds. No unsafe bxs observed    Group: [facility]         CONSULT NOTE    Patient Identification:  Naveed Sauceda  47 y.o.  male  1970  [id number]            Requesting physician: Anjana Palencia    Reason for Consultation:  Hypersomnia    CC:     History of Present Illness:  Very pleasant 47-year-old gentleman here for evaluation of hypersomnia.  He has history of some ventricular ectopy and palpitations.  Patient reports while trying to fall sleep on his back he wakes up gasping for breath.  He feels his symptoms are been ongoing for the last 1 year.  No clear history of snoring or witnessed apneas were reported.  No alcohol or caffeine abuse reported no parasomnias reported.  Sleep schedule time to about 10 PM gets up at 5 AM.  Gets about 7 hours of sleep and does not feel rested as such in the morning.  Also reports leg jerking at times.      Review of Systems  Positive for anxiety and frequent heartburn.  [place] 16 out of 24 consistent with sleepiness.  Past Medical History:  Past Medical History:   Diagnosis Date   • Acid reflux    • Allergic    • Kidney stone    • Observed sleep apnea    • Palpitations    • Shortness of breath    • Tired        Past Surgical History:  Past Surgical History:   Procedure Laterality Date   • CYSTOSCOPY W/ LASER LITHOTRIPSY          Home Meds:    (Not in a hospital admission)    Allergies:  Allergies   Allergen Reactions   • Penicillins Rash       Social History:   Social History     Social History   • Marital status:      Spouse name: N/A   • Number of children: N/A   • Years of education: N/A     Occupational History   • Not on file.     Social History Main Topics   • Smoking status: Never Smoker   • Smokeless tobacco: Never Used   • Alcohol use Yes      Comment: rarely   • Drug use: No   • Sexual activity: Defer     Other Topics Concern   • Not on file     Social History Narrative       Family History:  No family history on file.    Physical Exam:  /67 (BP Location: Left arm,  "Patient Position: Sitting)  Pulse 81  Ht 180.3 cm (71\")  Wt 74.4 kg (164 lb)  BMI 22.87 kg/m2 Body mass index is 22.87 kg/(m^2).   74.4 kg (164 lb)  Physical Exam  Awake alert no distress no labored breathing next line ENT Mallampati between 2 and 3  Neck is supple no thyromegaly  Chest is clear no wheezing or rales  CV is regular rate and rhythm  Abdomen is benign  Extra degrees no edema  CNS no deficits      LABS:  Lab Results   Component Value Date    CALCIUM 9.6 02/02/2018       No results found for: CKTOTAL, CKMB, CKMBINDEX, TROPONINI, TROPONINT                              Lab Results   Component Value Date    TSH 1.090 02/02/2018     Estimated Creatinine Clearance: 97.1 mL/min (by C-G formula based on Cr of 0.99).         Imaging: I personally visualized the images of scans/x-rays performed within last 3 days.      Assessment:  Hypersomnia with GERD      Recommendations:  This point we have a gentleman with hypersomnia with gasping on his back.  Sleep disordered breathing is to be considered and ruled out.  Educated patient on ANTHONY and sleep hygiene measures.  Patient agreeable wishing to proceed for home sleep study.  Sleep hygiene measures are discussed in detail.  We'll follow up and make further recommendations after reviewing the home sleep study.          Alyse Garcia MD  2/19/2018  5:26 PM      Much of this encounter note is an electronic transcription/translation of spoken language to printed text using Dragon Software.  "

## 2019-08-15 ENCOUNTER — OFFICE VISIT (OUTPATIENT)
Dept: ORTHOPEDIC SURGERY | Facility: CLINIC | Age: 49
End: 2019-08-15

## 2019-08-15 VITALS — TEMPERATURE: 98.2 F | HEIGHT: 71 IN | BODY MASS INDEX: 23.24 KG/M2 | WEIGHT: 166 LBS

## 2019-08-15 DIAGNOSIS — M25.551 CHRONIC PAIN OF BOTH HIPS: ICD-10-CM

## 2019-08-15 DIAGNOSIS — M25.552 CHRONIC PAIN OF BOTH HIPS: ICD-10-CM

## 2019-08-15 DIAGNOSIS — M25.562 ACUTE PAIN OF BOTH KNEES: Primary | ICD-10-CM

## 2019-08-15 DIAGNOSIS — M22.41 CHONDROMALACIA OF BOTH PATELLAE: ICD-10-CM

## 2019-08-15 DIAGNOSIS — M22.42 CHONDROMALACIA OF BOTH PATELLAE: ICD-10-CM

## 2019-08-15 DIAGNOSIS — G89.29 CHRONIC PAIN OF BOTH HIPS: ICD-10-CM

## 2019-08-15 DIAGNOSIS — M25.561 ACUTE PAIN OF BOTH KNEES: Primary | ICD-10-CM

## 2019-08-15 PROCEDURE — 73562 X-RAY EXAM OF KNEE 3: CPT | Performed by: ORTHOPAEDIC SURGERY

## 2019-08-15 PROCEDURE — 99243 OFF/OP CNSLTJ NEW/EST LOW 30: CPT | Performed by: ORTHOPAEDIC SURGERY

## 2019-08-15 NOTE — PROGRESS NOTES
"Patient Name: Naveed Sauceda   YOB: 1970  Referring Primary Care Physician: Anjana Palencia PA-C  BMI: Body mass index is 23.15 kg/m².    Chief Complaint:    Chief Complaint   Patient presents with   • Left Knee - Establish Care, Pain   • Right Knee - Establish Care, Pain        HPI:     Naveed Sauceda is a 49 y.o. male who presents today for evaluation of   Chief Complaint   Patient presents with   • Left Knee - Establish Care, Pain   • Right Knee - Establish Care, Pain   .  The patient is a 49-year-old  who has had pain in his \"knees\" for many years.  He is complained of stiffness off and on.  It is from his knee up to his hip area and sometimes in his low back.  When asked if he had back problems he replied yes but reviewing his chart he has a history of ankylosing spondylitis.  He said for last few months he has had more pain.  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  He is taken some ibuprofen off and on that helps a little.  Not had any physical therapy and he says he has another appointment next week for his hips.    This problem is new to this examiner.     Subjective   Medications:   Home Medications:  Current Outpatient Medications on File Prior to Visit   Medication Sig   • Fluticasone Furoate (ARNUITY ELLIPTA) 100 MCG/ACT aerosol powder  Inhale 100 mcg Daily.   • omeprazole (PRILOSEC) 20 MG capsule Take 1 capsule by mouth Daily. For GERD   • [DISCONTINUED] predniSONE (DELTASONE) 20 MG tablet Take 3 tabs QDPC x 3 days then 2 tabs QDPC x 4 days then 1 tab QDPC x 3 days     No current facility-administered medications on file prior to visit.      Current Medications:  Scheduled Meds:  Continuous Infusions:  No current facility-administered medications for this visit.   PRN Meds:.    I have reviewed the patient's medical history in detail and updated the computerized patient record.  Review and summarization of old records includes:    Past Medical " "History:   Diagnosis Date   • Acid reflux    • Allergic    • Ankylosing spondylitis of site in spine (CMS/[facility]) 1993   • Kidney stone    • Observed sleep apnea    • Osteoarthritis    • Palpitations    • Shortness of breath    • Tired         Past Surgical History:   Procedure Laterality Date   • CYSTOSCOPY W/ LASER LITHOTRIPSY          Social History     Occupational History   • Not on file   Tobacco Use   • Smoking status: Never Smoker   • Smokeless tobacco: Never Used   Substance and Sexual Activity   • Alcohol use: Yes     Comment: rarely use   • Drug use: No   • Sexual activity: Yes     Partners: Female     Birth control/protection: Condom, Surgical      Social History     Social History Narrative   • Not on file      History reviewed. No pertinent family history.    ROS: 14 point review of systems was performed and all other systems were reviewed and are negative except for documented findings in HPI and today's encounter.     Allergies:   Allergies   Allergen Reactions   • Penicillins Rash     Constitutional:  Denies fever, shaking or chills   Eyes:  Denies change in visual acuity   HENT:  Denies nasal congestion or sore throat   Respiratory:  Denies cough or shortness of breath   Cardiovascular:  Denies chest pain or severe LE edema   GI:  Denies abdominal pain, nausea, vomiting, bloody stools or diarrhea   Musculoskeletal:  Numbness, tingling, pain, or loss of motor function only as noted above in history of present illness.  : Denies painful urination or hematuria  Integument:  Denies rash, lesion or ulceration   Neurologic:  Denies headache or focal weakness  Endocrine:  Denies lymphadenopathy  Psych:  Denies confusion or change in mental status   Hem:  Denies active bleeding    OBJECTIVE:  Physical Exam:   Temp 98.2 °F (36.8 °C) (Temporal)   Ht 180.3 cm (71\")   Wt 75.3 kg (166 lb)   BMI 23.15 kg/m²     General Appearance:    Alert, cooperative, in no acute distress                  Eyes: conjunctiva " clear  ENT: external ears and nose atraumatic  CV: no peripheral edema  Resp: normal respiratory effort  Skin: no rashes or wounds; normal turgor  Psych: mood and affect appropriate  Lymph: no nodes appreciated  Neuro: gross sensation intact  Vascular:  Palpable peripheral pulse in noted extremity  Musculoskeletal Extremities: Patient Stinchfield is negative bilaterally he does have decreased internal rotation versus external rotation but no pain he has generalized stiffness in his low back his knees have mild swelling but have patellofemoral crepitation a full range of motion Leno's is negative ligamentous exams normal    Radiology:   AP lateral 40 degree PA x-rays bilateral knees taken the office today for knee pain without comparison views show at least moderate arthritis with mostly changes in the patellofemoral joints consistent with chondromalacia    Assessment:     ICD-10-CM ICD-9-CM   1. Acute pain of both knees M25.561 338.19    M25.562 719.46   2. Chronic pain of both hips M25.551 719.45    M25.552 338.29    G89.29    3. Chondromalacia of both patellae M22.41 717.7    M22.42         Procedures       Plan: Biomechanics of pertinent body area discussed.  Risks, benefits, alternatives, comparisons, and complications of accepted medicines, injections, recommendations, surgical procedures, and therapies explained and education provided in laymen's terms. Natural history and expected course of this patient's diagnosis discussed along with evaluation of therapies. Questions answered. When appropriate I also discussed proper use of cane, walker, trekking poles.   BMI:  The concept of BMI body mass index and its importance and implications discussed.  BMI suggested to be < 40 or as low as possible. Lifestyle measures for weight loss and how this affects orthopedic condition.  EXERCISES:  Advice on benefits of, and types of regular/moderate exercise including biomechanical forces involved as it pertains to this  complaint.  MEDICATIONS:  Prescription, OTC and Monitoring of Medications per orders to address ortho complaints; Evaluation and discussion of safety, precautions, side effects, and warnings given especially of long term NSAID or steroid therapy.    RICE: Rest, ice, compression, and elevation therapy, Cryotherapy/brachy therapy, and or OTC linaments as indicated with instructions.   PT referral.  CONSULT: This Consult is done at the request of a requesting provider to whom I will send this report with this rendered opinion.  MEDICAL RECORDS reviewed from other provider(s) for past and current medical history pertinent to this complaint.      8/15/2019    Much of this encounter note is an electronic transcription/translation of spoken language to printed text. The electronic translation of spoken language may permit erroneous, or at times, nonsensical words or phrases to be inadvertently transcribed; Although I have reviewed the note for such errors, some may still exist

## 2019-09-10 ENCOUNTER — HOSPITAL ENCOUNTER (OUTPATIENT)
Dept: PHYSICAL THERAPY | Facility: HOSPITAL | Age: 49
Setting detail: THERAPIES SERIES
Discharge: HOME | End: 2019-09-10

## 2019-09-10 DIAGNOSIS — G89.29 CHRONIC PAIN OF BOTH KNEES: Primary | ICD-10-CM

## 2019-09-10 DIAGNOSIS — M25.562 CHRONIC PAIN OF BOTH KNEES: Primary | ICD-10-CM

## 2019-09-10 DIAGNOSIS — M25.561 CHRONIC PAIN OF BOTH KNEES: Primary | ICD-10-CM

## 2019-09-10 PROCEDURE — 97110 THERAPEUTIC EXERCISES: CPT

## 2019-09-10 NOTE — THERAPY EVALUATION
Outpatient Physical Therapy Ortho Initial Evaluation  [facility]     Patient Name: Naveed Sauceda  : 1970  MRN: [id number]  Today's Date: 9/10/2019      Visit Date: 09/10/2019    Patient Active Problem List   Diagnosis   • Acid reflux   • Palpitations   • Shortness of breath   • RBBB        Past Medical History:   Diagnosis Date   • Acid reflux    • Allergic    • Ankylosing spondylitis of site in spine (CMS/Formerly [facility])    • Kidney stone    • Observed sleep apnea    • Osteoarthritis    • Palpitations    • Shortness of breath    • Tired         Past Surgical History:   Procedure Laterality Date   • CYSTOSCOPY W/ LASER LITHOTRIPSY         Visit Dx:     ICD-10-CM ICD-9-CM   1. Chronic pain of both knees M25.561 719.46    M25.562 338.29    G89.29          Patient History     Row Name 09/10/19 1700             History    Chief Complaint  Pain  -RS      Type of Pain  Knee pain  -RS      Date Current Problem(s) Began  04/10/19  -RS      Brief Description of Current Complaint  The pt is a 48 yo male who presents with B knee pain presents since about April. His knee pain has actually been better but he wants to know what to do to help. He had xrays which looked ok at the tib-fem joint but PFP joint showed some cartilage wear. He works as an  and must squat/climb/kneel often. He sometimes runs but has been trying to ride a bike which felt good. Denies numbness or tingling. He has some increased pain when walking over uneven ground.  -RS      Previous treatment for THIS PROBLEM  Medication  -RS      Patient/Caregiver Goals  Relieve pain;Return to prior level of function;Improve mobility;Know what to do to help the symptoms  -RS      Hand Dominance  right-handed  -RS      Occupation/sports/leisure activities    -RS         Pain     Pain Location  Knee  -RS      Pain at Present  2  -RS      Pain at Best  1  -RS      Pain at Worst  3  -RS      Is your sleep disturbed?  Yes  -RS      Difficulties at  work?  climbing, walking over uneven ground, squatting, kneeling, crawling  -RS      Difficulties with ADL's?  stairs  -RS      Difficulties with recreational activities?  running/ biking  -RS         Fall Risk Assessment    Any falls in the past year:  No  -RS         Services    Are you currently receiving Home Health services  No  -RS         Daily Activities    Primary Language  English  -RS      How does patient learn best?  Listening;Reading  -RS      Pt Participated in POC and Goals  Yes  -RS         Safety    Are you being hurt, hit, or frightened by anyone at home or in your life?  No  -RS      Are you being neglected by a caregiver  No  -RS        User Key  (r) = Recorded By, (t) = Taken By, (c) = Cosigned By    Initials Name Provider Type    RS Kimmy Willams PT Physical Therapist          PT Ortho     Row Name 09/10/19 1700       Special Tests/Palpation    Special Tests/Palpation  Knee  -RS       Patellar Accessory Motions    Patellar Accessory Motions Tested?  Yes  -RS    Superior glide  WNL  -RS    Inferior glide  WNL  -RS    Medial glide  Left:;Hypomobile;Right:;WNL  -RS    Lateral glide  Left:;Hypomobile;Right:;WNL  -RS       Knee Special Tests    Patellar grind test (chondromalacia patella)  Bilateral:;Positive  -RS    Patellofemoral apprehension sign (instability)  Bilateral:;Negative  -RS       General ROM    RT Lower Ext  Rt Knee Extension/Flexion  -RS    LT Lower Ext  Lt Knee Extension/Flexion  -RS       Right Lower Ext    Rt Knee Extension/Flexion AROM  0-135  -RS       Left Lower Ext    Lt Knee Extension/Flexion AROM  0-135  -RS       MMT (Manual Muscle Testing)    Rt Lower Ext  Rt Hip Flexion;Rt Hip Extension;Rt Hip ABduction;Rt Knee Extension;Rt Knee Flexion;Rt Ankle Plantarflexion;Rt Ankle Dorsiflexion  -RS    Lt Lower Ext  Lt Hip Flexion;Lt Hip Extension;Lt Hip ABduction;Lt Knee Extension;Lt Knee Flexion;Lt Ankle Dorsiflexion;Lt Ankle Plantarflexion  -RS       MMT Right Lower Ext    Rt  Hip Flexion MMT, Gross Movement  (5/5) normal  -RS    Rt Hip Extension MMT, Gross Movement  (4+/5) good plus  -RS    Rt Hip ABduction MMT, Gross Movement  (4/5) good  -RS    Rt Knee Extension MMT, Gross Movement  (5/5) normal  -RS    Rt Knee Flexion MMT, Gross Movement  (5/5) normal  -RS    Rt Ankle Plantarflexion MMT, Gross Movement  (5/5) normal  -RS    Rt Ankle Dorsiflexion MMT, Gross Movement  (5/5) normal  -RS       MMT Left Lower Ext    Lt Hip Flexion MMT, Gross Movement  (5/5) normal  -RS    Lt Hip Extension MMT, Gross Movement  (4+/5) good plus  -RS    Lt Hip ABduction MMT, Gross Movement  (4/5) good  -RS    Lt Knee Extension MMT, Gross Movement  (4+/5) good plus  -RS    Lt Knee Flexion MMT, Gross Movement  (4+/5) good plus  -RS    Lt Ankle Plantarflexion MMT, Gross Movement  (5/5) normal  -RS    Lt Ankle Dorsiflexion MMT, Gross Movement  (5/5) normal  -RS       Sensation    Sensation WNL?  WNL  -RS       Balance Skills Training    SLS  noted decreased stability on B LE during SLS and SL mini squat, more instability on RLE compared to L, reports a Hx R ankle sprains  -RS      User Key  (r) = Recorded By, (t) = Taken By, (c) = Cosigned By    Initials Name Provider Type    RS Kimmy Willams, PT Physical Therapist                      Therapy Education  Education Details: Role of outpatient PT, POC, differential diagnosis, initial HEP, expectations, goals.  Given: HEP  Program: New  How Provided: Verbal, Demonstration, Written  Provided to: Patient  Level of Understanding: Verbalized, Demonstrated     PT OP Goals     Row Name 09/10/19 1700          PT Short Term Goals    STG Date to Achieve  09/25/19  -RS     STG 1  The pt will demonstrate IND with initial HEP.  -RS     STG 1 Progress  New  -RS     STG 2  The pt will resume reciprocal stair climbing without increased pain for improved home navigation.  -RS     STG 2 Progress  New  -RS        Long Term Goals    LTG Date to Achieve  11/04/19  -RS     LTG 1   The pt will demonstrate IND with progressive HEP for IND condition management andd return to PLOF.  -RS     LTG 1 Progress  New  -RS     LTG 2  The pt will squat/ kneel/crawl during a typical work day without increased pain for improved work performance.  -RS     LTG 2 Progress  New  -RS     LTG 3  The pt will demonstrate good understanding of proper body mechanics during crawling/lifting/climbing/squatting for return to work with safe mechanics.  -RS     LTG 3 Progress  New  -RS     LTG 4  The pt will score greater than or equal to 80% ability on the KOS to indicate improvevd perceived performance of ADLs.  -RS     LTG 4 Progress  New  -RS     LTG 5  The pt will demonstrate BLE and hip strength to 5/5 for improved squatting/ climbing perforance.  -RS     LTG 5 Progress  New  -RS        Time Calculation    PT Goal Re-Cert Due Date  12/09/19  -RS       User Key  (r) = Recorded By, (t) = Taken By, (c) = Cosigned By    Initials Name Provider Type    RS Kimmy Willams, PT Physical Therapist          PT Assessment/Plan     Row Name 09/10/19 1800          PT Assessment    Functional Limitations  Limitations in community activities;Limitations in functional capacity and performance;Performance in work activities;Performance in leisure activities  -RS     Impairments  Balance;Posture;Poor body mechanics;Pain;Muscle strength  -RS     Assessment Comments  Naveed Sauceda is a 49 y.o. male referred to physical therapy for B knee pain/ hip pain. He presents with a stable clinical presentation, along with no remarkable comorbidities and personal factors of job demands () that may impact his progress in the plan of care. Pt presents today with decreased SLS, decreased B hip ER/abduction strength, decreased mobility B hips, ,increased pain with squatting/climbing . his signs and symptoms are consistent with a physical therapy diagnosis of patellofemoral pain. The previous impairments limit his ability to perform job  demands, recreational activities, and climb stairs painfree.  He scores 68% ability on the KOS. Pt will benefit from skilled PT to address the previous impairments and return to PLOF.  -RS     Please refer to paper survey for additional self-reported information  Yes  -RS     Rehab Potential  Good  -RS     Patient/caregiver participated in establishment of treatment plan and goals  Yes  -RS     Patient would benefit from skilled therapy intervention  Yes  -RS        PT Plan    PT Frequency  2x/week  -RS     Predicted Duration of Therapy Intervention (Therapy Eval)  8 weeks  -RS     Planned CPT's?  PT EVAL LOW COMPLEXITY: 11119;PT RE-EVAL: 46914;PT THER PROC EA 15 MIN: 57198;PT THER ACT EA 15 MIN: 03917;PT MANUAL THERAPY EA 15 MIN: 62497;PT NEUROMUSC RE-EDUCATION EA 15 MIN: 77770;PT GAIT TRAINING EA 15 MIN: 33568;PT SELF CARE/HOME MGMT/TRAIN EA 15: 46424;PT HOT OR COLD PACK TREAT MCARE;PT ELECTRICAL STIM UNATTEND: ;PT ULTRASOUND EA 15 MIN: 06800  -RS     Physical Therapy Interventions (Optional Details)  stretching;strengthening;home exercise program  -RS     PT Plan Comments  Assess tolerance for initial HEP, progress therex focused on improved hip stability.  -RS       User Key  (r) = Recorded By, (t) = Taken By, (c) = Cosigned By    Initials Name Provider Type    RS Kimmy Willams, PT Physical Therapist            OP Exercises     Row Name 09/10/19 1700             Total Minutes    52994 - PT Therapeutic Exercise Minutes  12  -RS         Exercise 1    Exercise Name 1  piriformis stretch  -RS      Cueing 1  Verbal  -RS      Reps 1  3  -RS      Time 1  20s  -RS         Exercise 2    Exercise Name 2  bridge with band  -RS      Cueing 2  Verbal  -RS      Reps 2  10  -RS      Time 2  5s  -RS         Exercise 3    Exercise Name 3  clamshell  -RS      Cueing 3  Verbal  -RS      Reps 3  15ea  -RS      Additional Comments  RTB  -RS         Exercise 4    Exercise Name 4  heel tap foam  -RS      Cueing 4  Verbal  -RS       Reps 4  10ea  -RS      Additional Comments  cues for hips back, avoid femoral IR  -RS         Exercise 5    Exercise Name 5  consider bike, s/l hip aab, HS curl ball, SL bridge eccentric, TKE, leg press, LAQ with weight, mini squat with band  -RS        User Key  (r) = Recorded By, (t) = Taken By, (c) = Cosigned By    Initials Name Provider Type    RS Kimmy Willams, PT Physical Therapist                        Outcome Measure Options: Knee Outcome Score- ADL  Knee Outcome Score  Knee Outcome Score Comments: 68%      Time Calculation:     Start Time: 1700  Stop Time: 1740  Time Calculation (min): 40 min     Therapy Charges for Today     Code Description Service Date Service Provider Modifiers Qty    27190357893 HC PT THER PROC EA 15 MIN 9/10/2019 Kimmy Willams, PT GP 1          PT G-Codes  Outcome Measure Options: Knee Outcome Score- ADL         Kimmy Willams PT  9/10/2019

## 2019-09-12 ENCOUNTER — HOSPITAL ENCOUNTER (OUTPATIENT)
Dept: PHYSICAL THERAPY | Facility: HOSPITAL | Age: 49
Setting detail: THERAPIES SERIES
Discharge: HOME | End: 2019-09-12

## 2019-09-12 DIAGNOSIS — M25.562 CHRONIC PAIN OF BOTH KNEES: Primary | ICD-10-CM

## 2019-09-12 DIAGNOSIS — G89.29 CHRONIC PAIN OF BOTH KNEES: Primary | ICD-10-CM

## 2019-09-12 DIAGNOSIS — M25.561 CHRONIC PAIN OF BOTH KNEES: Primary | ICD-10-CM

## 2019-09-12 PROCEDURE — 97110 THERAPEUTIC EXERCISES: CPT

## 2019-09-12 NOTE — THERAPY TREATMENT NOTE
Outpatient Physical Therapy Ortho Treatment Note  [facility]     Patient Name: Naveed Sauceda  : 1970  MRN: [id number]  Today's Date: 2019      Visit Date: 2019    Visit Dx:    ICD-10-CM ICD-9-CM   1. Chronic pain of both knees M25.561 719.46    M25.562 338.29    G89.29        Patient Active Problem List   Diagnosis   • Acid reflux   • Palpitations   • Shortness of breath   • RBBB        Past Medical History:   Diagnosis Date   • Acid reflux    • Allergic    • Ankylosing spondylitis of site in spine (CMS/[facility]ly [facility])    • Kidney stone    • Observed sleep apnea    • Osteoarthritis    • Palpitations    • Shortness of breath    • Tired         Past Surgical History:   Procedure Laterality Date   • CYSTOSCOPY W/ LASER LITHOTRIPSY         PT Ortho     Row Name 09/10/19 1700       Special Tests/Palpation    Special Tests/Palpation  Knee  -RS       Patellar Accessory Motions    Patellar Accessory Motions Tested?  Yes  -RS    Superior glide  WNL  -RS    Inferior glide  WNL  -RS    Medial glide  Left:;Hypomobile;Right:;WNL  -RS    Lateral glide  Left:;Hypomobile;Right:;WNL  -RS       Knee Special Tests    Patellar grind test (chondromalacia patella)  Bilateral:;Positive  -RS    Patellofemoral apprehension sign (instability)  Bilateral:;Negative  -RS       General ROM    RT Lower Ext  Rt Knee Extension/Flexion  -RS    LT Lower Ext  Lt Knee Extension/Flexion  -RS       Right Lower Ext    Rt Knee Extension/Flexion AROM  0-135  -RS       Left Lower Ext    Lt Knee Extension/Flexion AROM  0-135  -RS       MMT (Manual Muscle Testing)    Rt Lower Ext  Rt Hip Flexion;Rt Hip Extension;Rt Hip ABduction;Rt Knee Extension;Rt Knee Flexion;Rt Ankle Plantarflexion;Rt Ankle Dorsiflexion  -RS    Lt Lower Ext  Lt Hip Flexion;Lt Hip Extension;Lt Hip ABduction;Lt Knee Extension;Lt Knee Flexion;Lt Ankle Dorsiflexion;Lt Ankle Plantarflexion  -RS       MMT Right Lower Ext    Rt Hip Flexion MMT, Gross Movement  (5/5) normal  -RS     Rt Hip Extension MMT, Gross Movement  (4+/5) good plus  -RS    Rt Hip ABduction MMT, Gross Movement  (4/5) good  -RS    Rt Knee Extension MMT, Gross Movement  (5/5) normal  -RS    Rt Knee Flexion MMT, Gross Movement  (5/5) normal  -RS    Rt Ankle Plantarflexion MMT, Gross Movement  (5/5) normal  -RS    Rt Ankle Dorsiflexion MMT, Gross Movement  (5/5) normal  -RS       MMT Left Lower Ext    Lt Hip Flexion MMT, Gross Movement  (5/5) normal  -RS    Lt Hip Extension MMT, Gross Movement  (4+/5) good plus  -RS    Lt Hip ABduction MMT, Gross Movement  (4/5) good  -RS    Lt Knee Extension MMT, Gross Movement  (4+/5) good plus  -RS    Lt Knee Flexion MMT, Gross Movement  (4+/5) good plus  -RS    Lt Ankle Plantarflexion MMT, Gross Movement  (5/5) normal  -RS    Lt Ankle Dorsiflexion MMT, Gross Movement  (5/5) normal  -RS       Sensation    Sensation WNL?  WNL  -RS       Balance Skills Training    SLS  noted decreased stability on B LE during SLS and SL mini squat, more instability on RLE compared to L, reports a Hx R ankle sprains  -RS      User Key  (r) = Recorded By, (t) = Taken By, (c) = Cosigned By    Initials Name Provider Type    RS Kimmy Willams, PT Physical Therapist                      PT Assessment/Plan     Row Name 09/12/19 1800          PT Assessment    Assessment Comments  Mr. Sauceda reports no significant changes since initial eval, has mild discomfort in knees when doing some climbing/walking. Progressed therex program focused on improved quad stability and hip abduction/ ER  strength and motor control. Pt with poor motor control during mini squat and step down, held step down for HEP. Updated HEP to include resisted side steps, HS stretch, and gastroc stretch with good tolerance.   -RS        PT Plan    PT Plan Comments  Assess tolerance for therex and HEP progression, continue to emphasize proper squat form and review body mechanics during work performance.  -RS       User Key  (r) = Recorded By, (t) =  Taken By, (c) = Cosigned By    Initials Name Provider Type    RS Kimmy Willams, PT Physical Therapist            OP Exercises     Row Name 09/12/19 1700             Subjective Comments    Subjective Comments  The pt reports he has no pain in his knees, some discomfort.  -RS         Subjective Pain    Able to rate subjective pain?  yes  -RS      Subjective Pain Comment  denies pain, reports discomfort  -RS         Total Minutes    14241 - PT Therapeutic Exercise Minutes  42  -RS         Exercise 1    Exercise Name 1  piriformis stretch  -RS      Cueing 1  Verbal  -RS      Reps 1  3  -RS      Time 1  20s  -RS         Exercise 2    Exercise Name 2  SL bridge- figure 4  -RS      Cueing 2  Verbal  -RS      Reps 2  10  -RS      Time 2  5s  -RS         Exercise 3    Exercise Name 3  clamshell  -RS      Cueing 3  Verbal  -RS      Reps 3  15ea  -RS      Additional Comments  RTB  -RS         Exercise 4    Exercise Name 4  --  -RS      Cueing 4  --  -RS      Reps 4  --  -RS      Additional Comments  held- poor form/ slight pain  -RS         Exercise 5    Exercise Name 5  bike  -RS      Cueing 5  Verbal  -RS      Time 5  5 min  -RS      Additional Comments  L3  -RS         Exercise 6    Exercise Name 6  HS curl on ball  -RS      Cueing 6  Verbal  -RS      Reps 6  10  -RS      Additional Comments  green ball  -RS         Exercise 7    Exercise Name 7  leg press  -RS      Cueing 7  Demo  -RS      Reps 7  15  -RS         Exercise 8    Exercise Name 8  TKE standing  -RS      Cueing 8  Demo  -RS      Reps 8  15  -RS      Time 8  5s ball  -RS         Exercise 9    Exercise Name 9  mini squat with TB at knees  -RS      Cueing 9  Demo  -RS      Reps 9  10  -RS      Additional Comments  painfree ROM  -RS         Exercise 10    Exercise Name 10  modified side plank  -RS      Cueing 10  Verbal  -RS      Reps 10  2  -RS      Time 10  20s  -RS         Exercise 11    Exercise Name 11  stair HS stretch  -RS      Cueing 11  Verbal;Demo   -RS      Reps 11  3  -RS      Time 11  20s  -RS         Exercise 12    Exercise Name 12  side steps with band  -RS      Cueing 12  Verbal  -RS      Reps 12  3 laps  -RS      Additional Comments  RTB ankles  -RS         Exercise 13    Exercise Name 13  gastroc stretch stairs  -RS      Cueing 13  Demo  -RS      Reps 13  3  -RS      Time 13  20s  -RS        User Key  (r) = Recorded By, (t) = Taken By, (c) = Cosigned By    Initials Name Provider Type    RS Kimmy Willams PT Physical Therapist                       PT OP Goals     Row Name 09/12/19 1800          PT Short Term Goals    STG Date to Achieve  09/25/19  -RS     STG 1  The pt will demonstrate IND with initial HEP.  -RS     STG 1 Progress  Ongoing  -RS     STG 2  The pt will resume reciprocal stair climbing without increased pain for improved home navigation.  -RS     STG 2 Progress  Ongoing  -RS        Long Term Goals    LTG Date to Achieve  11/04/19  -RS     LTG 1  The pt will demonstrate IND with progressive HEP for IND condition management andd return to PLOF.  -RS     LTG 1 Progress  Ongoing  -RS     LTG 2  The pt will squat/ kneel/crawl during a typical work day without increased pain for improved work performance.  -RS     LTG 2 Progress  Ongoing  -RS     LTG 3  The pt will demonstrate good understanding of proper body mechanics during crawling/lifting/climbing/squatting for return to work with safe mechanics.  -RS     LTG 3 Progress  Ongoing  -RS     LTG 4  The pt will score greater than or equal to 80% ability on the KOS to indicate improvevd perceived performance of ADLs.  -RS     LTG 4 Progress  Ongoing  -RS     LTG 5  The pt will demonstrate BLE and hip strength to 5/5 for improved squatting/ climbing perforance.  -RS     LTG 5 Progress  Ongoing  -RS       User Key  (r) = Recorded By, (t) = Taken By, (c) = Cosigned By    Initials Name Provider Type    RS Kimmy Willams PT Physical Therapist                         Time Calculation:   Start  Time: 1745  Stop Time: 1830  Time Calculation (min): 45 min  Therapy Charges for Today     Code Description Service Date Service Provider Modifiers Qty    53893041690 HC PT THER PROC EA 15 MIN 9/12/2019 Kimmy Willams, PT GP 3                    Kimmy Willams, PT  9/12/2019

## 2019-09-26 ENCOUNTER — HOSPITAL ENCOUNTER (OUTPATIENT)
Dept: PHYSICAL THERAPY | Facility: HOSPITAL | Age: 49
Setting detail: THERAPIES SERIES
Discharge: HOME | End: 2019-09-26

## 2019-09-26 DIAGNOSIS — M25.561 CHRONIC PAIN OF BOTH KNEES: Primary | ICD-10-CM

## 2019-09-26 DIAGNOSIS — G89.29 CHRONIC PAIN OF BOTH KNEES: Primary | ICD-10-CM

## 2019-09-26 DIAGNOSIS — M25.562 CHRONIC PAIN OF BOTH KNEES: Primary | ICD-10-CM

## 2019-09-26 PROCEDURE — 97110 THERAPEUTIC EXERCISES: CPT

## 2019-09-26 NOTE — THERAPY TREATMENT NOTE
Outpatient Physical Therapy Ortho Treatment Note  [facility]     Patient Name: Naveed Sauceda  : 1970  MRN: [id number]  Today's Date: 2019      Visit Date: 2019    Visit Dx:    ICD-10-CM ICD-9-CM   1. Chronic pain of both knees M25.561 719.46    M25.562 338.29    G89.29        Patient Active Problem List   Diagnosis   • Acid reflux   • Palpitations   • Shortness of breath   • RBBB        Past Medical History:   Diagnosis Date   • Acid reflux    • Allergic    • Ankylosing spondylitis of site in spine (CMS/[facility])    • Kidney stone    • Observed sleep apnea    • Osteoarthritis    • Palpitations    • Shortness of breath    • Tired         Past Surgical History:   Procedure Laterality Date   • CYSTOSCOPY W/ LASER LITHOTRIPSY                         PT Assessment/Plan     Row Name 19 1710          PT Assessment    Assessment Comments  Discussed purchasing ankle weights. Added open chain exercises. Assess response next visit  -WS       User Key  (r) = Recorded By, (t) = Taken By, (c) = Cosigned By    Initials Name Provider Type    WS Elder Lin PTA Physical Therapy Assistant            OP Exercises     Row Name 19 1615             Subjective Comments    Subjective Comments  no pain in knees, not climbing ladders like I did during the summer  -WS         Total Minutes    13727 - PT Therapeutic Exercise Minutes  45  -WS         Exercise 1    Exercise Name 1  piriformis stretch  -WS      Cueing 1  Verbal  -WS      Reps 1  3  -WS      Time 1  20s  -WS         Exercise 2    Exercise Name 2  SL bridge- figure 4  -WS      Cueing 2  Verbal  -WS      Reps 2  15  -WS      Time 2  5s  -WS         Exercise 3    Exercise Name 3  clamshell supine  -WS      Cueing 3  Verbal  -WS      Reps 3  15ea  -WS      Additional Comments  RTB  -WS         Exercise 4    Exercise Name 4  LAQ 5#  -WS      Reps 4  20  -WS         Exercise 5    Exercise Name 5  bike  -WS      Cueing 5  Verbal  -WS      Time  5  5 min  -WS         Exercise 6    Exercise Name 6  HS curl on ball  -WS      Cueing 6  Verbal  -WS         Exercise 7    Exercise Name 7  leg press  -WS      Cueing 7  Demo  -WS      Reps 7  20  -WS      Additional Comments  140#  -WS         Exercise 8    Exercise Name 8  TKE standing  -WS      Cueing 8  Demo  -WS      Reps 8  15  -WS      Time 8  5s ball  -WS         Exercise 10    Exercise Name 10  modified side plank  -WS      Cueing 10  Verbal  -WS      Reps 10  2  -WS      Time 10  20s  -WS         Exercise 11    Exercise Name 11  stair HS stretch  -WS      Cueing 11  Verbal;Demo  -WS      Reps 11  3  -WS      Time 11  20s  -WS         Exercise 12    Exercise Name 12  side steps with band  -WS      Cueing 12  Verbal  -WS      Reps 12  3 laps  -WS      Additional Comments  RTB ankle  -WS         Exercise 13    Exercise Name 13  gastroc stretch stairs  -WS      Cueing 13  Demo  -WS      Reps 13  3  -WS      Time 13  20s  -WS         Exercise 14    Exercise Name 14  --  -WS      Reps 14  --  -WS         Exercise 15    Exercise Name 15  standing HS curl 4#  -WS      Reps 15  20  -WS        User Key  (r) = Recorded By, (t) = Taken By, (c) = Cosigned By    Initials Name Provider Type    Elder Au PTA Physical Therapy Assistant                       PT OP Goals     Row Name 09/26/19 1700          PT Short Term Goals    STG Date to Achieve  09/25/19  -     STG 1  The pt will demonstrate IND with initial HEP.  -     STG 1 Progress  Ongoing  -     STG 2  The pt will resume reciprocal stair climbing without increased pain for improved home navigation.  -     STG 2 Progress  Ongoing  -        Long Term Goals    LTG Date to Achieve  11/04/19  -     LTG 1  The pt will demonstrate IND with progressive HEP for IND condition management andd return to PLOF.  -     LTG 1 Progress  Ongoing  -     LTG 2  The pt will squat/ kneel/crawl during a typical work day without increased pain for improved work  performance.  -     LTG 2 Progress  Ongoing  -     LTG 3  The pt will demonstrate good understanding of proper body mechanics during crawling/lifting/climbing/squatting for return to work with safe mechanics.  -WS     LTG 3 Progress  Ongoing  -WS     LTG 4  The pt will score greater than or equal to 80% ability on the KOS to indicate improvevd perceived performance of ADLs.  -     LTG 4 Progress  Ongoing  -     LTG 5  The pt will demonstrate BLE and hip strength to 5/5 for improved squatting/ climbing perforance.  -     LTG 5 Progress  Ongoing  -       User Key  (r) = Recorded By, (t) = Taken By, (c) = Cosigned By    Initials Name Provider Type    Elder Au PTA Physical Therapy Assistant          Therapy Education  Given: HEP, Symptoms/condition management  Program: Reinforced, New  How Provided: Verbal  Provided to: Patient              Time Calculation:   Start Time: 1615  Stop Time: 1700  Time Calculation (min): 45 min  Therapy Charges for Today     Code Description Service Date Service Provider Modifiers Qty    87004952778 HC PT THER PROC EA 15 MIN 9/26/2019 Elder Lin PTA GP 3                    Elder Lin PTA  9/26/2019

## 2019-10-02 ENCOUNTER — HOSPITAL ENCOUNTER (OUTPATIENT)
Dept: PHYSICAL THERAPY | Facility: HOSPITAL | Age: 49
Setting detail: THERAPIES SERIES
Discharge: HOME | End: 2019-10-02

## 2019-10-02 DIAGNOSIS — G89.29 CHRONIC PAIN OF BOTH KNEES: Primary | ICD-10-CM

## 2019-10-02 DIAGNOSIS — M25.562 CHRONIC PAIN OF BOTH KNEES: Primary | ICD-10-CM

## 2019-10-02 DIAGNOSIS — M25.561 CHRONIC PAIN OF BOTH KNEES: Primary | ICD-10-CM

## 2019-10-02 PROCEDURE — 97110 THERAPEUTIC EXERCISES: CPT

## 2019-10-02 NOTE — THERAPY TREATMENT NOTE
Outpatient Physical Therapy Ortho Treatment Note  [facility]     Patient Name: Naveed Sauceda  : 1970  MRN: [id number]  Today's Date: 10/2/2019      Visit Date: 10/02/2019    Visit Dx:    ICD-10-CM ICD-9-CM   1. Chronic pain of both knees M25.561 719.46    M25.562 338.29    G89.29        Patient Active Problem List   Diagnosis   • Acid reflux   • Palpitations   • Shortness of breath   • RBBB        Past Medical History:   Diagnosis Date   • Acid reflux    • Allergic    • Ankylosing spondylitis of site in spine (CMS/[facility])    • Kidney stone    • Observed sleep apnea    • Osteoarthritis    • Palpitations    • Shortness of breath    • Tired         Past Surgical History:   Procedure Laterality Date   • CYSTOSCOPY W/ LASER LITHOTRIPSY                         PT Assessment/Plan     Row Name 10/02/19 1742          PT Assessment    Assessment Comments  Progressed weights. Pain well controlled. Stairs with reciprcal gait no problems  -WS       User Key  (r) = Recorded By, (t) = Taken By, (c) = Cosigned By    Initials Name Provider Type    WS Elder Lin PTA Physical Therapy Assistant            OP Exercises     Row Name 10/02/19 1700             Subjective Comments    Subjective Comments  Back of legs sore after last session. Bought ankle weights  -WS         Subjective Pain    Able to rate subjective pain?  yes  -WS      Pre-Treatment Pain Level  0  -WS         Total Minutes    83719 - PT Therapeutic Exercise Minutes  40  -WS         Exercise 1    Exercise Name 1  piriformis stretch  -WS      Cueing 1  Verbal  -WS      Reps 1  3  -WS      Time 1  20s  -WS         Exercise 2    Exercise Name 2  SL bridge- figure 4  -WS      Cueing 2  Verbal  -WS      Reps 2  15  -WS      Time 2  5s  -WS         Exercise 3    Exercise Name 3  clamshell supine  -WS      Cueing 3  Verbal  -WS      Reps 3  15ea  -WS      Additional Comments  RTB  -WS         Exercise 4    Exercise Name 4  LAQ 7#  -WS      Reps 4  20  -WS       Additional Comments  next 10#  -WS         Exercise 5    Exercise Name 5  bike  -WS      Cueing 5  Verbal  -WS      Time 5  5 min  -WS         Exercise 6    Exercise Name 6  HS curl on ball  -WS      Cueing 6  Verbal  -WS         Exercise 7    Exercise Name 7  leg press  -WS      Cueing 7  Demo  -WS      Reps 7  20  -WS      Additional Comments  140#  -WS         Exercise 8    Exercise Name 8  TKE standing  -WS      Cueing 8  Demo  -WS      Reps 8  15  -WS      Additional Comments  Black TB  -WS         Exercise 9    Exercise Name 9  calf raise on steps  -WS      Reps 9  20  -WS         Exercise 10    Exercise Name 10  modified side plank  -WS      Cueing 10  Verbal  -WS      Reps 10  2  -WS      Time 10  20s  -WS         Exercise 11    Exercise Name 11  stair HS stretch  -WS      Cueing 11  Verbal;Demo  -WS      Reps 11  3  -WS      Time 11  20s  -WS         Exercise 12    Exercise Name 12  side steps with band  -WS      Cueing 12  Verbal  -WS      Reps 12  3 laps  -WS      Additional Comments  RTB ankle  -WS         Exercise 13    Exercise Name 13  gastroc stretch stairs  -WS      Cueing 13  Demo  -WS      Reps 13  3  -WS      Time 13  20s  -WS         Exercise 15    Exercise Name 15  standing HS curl   -WS      Reps 15  20  -WS      Additional Comments  5#  -WS        User Key  (r) = Recorded By, (t) = Taken By, (c) = Cosigned By    Initials Name Provider Type    Elder Au PTA Physical Therapy Assistant                       PT OP Goals     Row Name 10/02/19 1700          PT Short Term Goals    STG Date to Achieve  09/25/19  -WS     STG 1  The pt will demonstrate IND with initial HEP.  -WS     STG 1 Progress  Met  -WS     STG 2  The pt will resume reciprocal stair climbing without increased pain for improved home navigation.  -     STG 2 Progress  Met  -        Long Term Goals    LTG Date to Achieve  11/04/19  -WS     LTG 1  The pt will demonstrate IND with progressive HEP for IND condition  management andd return to PLOF.  -     LTG 1 Progress  Ongoing  -     LTG 2  The pt will squat/ kneel/crawl during a typical work day without increased pain for improved work performance.  -     LTG 2 Progress  Ongoing  -     LTG 3  The pt will demonstrate good understanding of proper body mechanics during crawling/lifting/climbing/squatting for return to work with safe mechanics.  -     LTG 3 Progress  Ongoing  -     LTG 4  The pt will score greater than or equal to 80% ability on the KOS to indicate improvevd perceived performance of ADLs.  -     LTG 4 Progress  Ongoing  -     LTG 5  The pt will demonstrate BLE and hip strength to 5/5 for improved squatting/ climbing perforance.  -     LTG 5 Progress  Ongoing  -       User Key  (r) = Recorded By, (t) = Taken By, (c) = Cosigned By    Initials Name Provider Type    Elder Au PTA Physical Therapy Assistant          Therapy Education  Given: HEP, Symptoms/condition management  Program: Reinforced  How Provided: Verbal  Provided to: Patient              Time Calculation:   Start Time: 1715  Stop Time: 1755  Time Calculation (min): 40 min  Therapy Charges for Today     Code Description Service Date Service Provider Modifiers Qty    43509808887 HC PT THER PROC EA 15 MIN 10/2/2019 Elder Lin PTA GP 3                    Elder Lin PTA  10/2/2019

## 2019-10-07 ENCOUNTER — HOSPITAL ENCOUNTER (OUTPATIENT)
Dept: PHYSICAL THERAPY | Facility: HOSPITAL | Age: 49
Setting detail: THERAPIES SERIES
Discharge: HOME | End: 2019-10-07

## 2019-10-07 DIAGNOSIS — M25.562 CHRONIC PAIN OF BOTH KNEES: Primary | ICD-10-CM

## 2019-10-07 DIAGNOSIS — M25.561 CHRONIC PAIN OF BOTH KNEES: Primary | ICD-10-CM

## 2019-10-07 DIAGNOSIS — G89.29 CHRONIC PAIN OF BOTH KNEES: Primary | ICD-10-CM

## 2019-10-07 PROCEDURE — 97110 THERAPEUTIC EXERCISES: CPT

## 2019-10-07 NOTE — THERAPY TREATMENT NOTE
Outpatient Physical Therapy Ortho Treatment Note  [facility]     Patient Name: Naveed Sauceda  : 1970  MRN: [id number]  Today's Date: 10/7/2019      Visit Date: 10/07/2019    Visit Dx:    ICD-10-CM ICD-9-CM   1. Chronic pain of both knees M25.561 719.46    M25.562 338.29    G89.29        Patient Active Problem List   Diagnosis   • Acid reflux   • Palpitations   • Shortness of breath   • RBBB        Past Medical History:   Diagnosis Date   • Acid reflux    • Allergic    • Ankylosing spondylitis of site in spine (CMS/[facility])    • Kidney stone    • Observed sleep apnea    • Osteoarthritis    • Palpitations    • Shortness of breath    • Tired         Past Surgical History:   Procedure Laterality Date   • CYSTOSCOPY W/ LASER LITHOTRIPSY                         PT Assessment/Plan     Row Name 10/07/19 1656          PT Assessment    Assessment Comments  Stairs with reciprocal gait. Knee pain well controlled. Continue to progress functional strengthening. Increased weight with LAQ  -WS       User Key  (r) = Recorded By, (t) = Taken By, (c) = Cosigned By    Initials Name Provider Type    Elder Au PTA Physical Therapy Assistant            OP Exercises     Row Name 10/07/19 1615             Subjective Comments    Subjective Comments  HS 's get sore after exercises but getting better  -WS         Subjective Pain    Able to rate subjective pain?  yes  -WS      Pre-Treatment Pain Level  0  -WS         Total Minutes    11348 - PT Therapeutic Exercise Minutes  40  -WS         Exercise 1    Exercise Name 1  piriformis stretch  -WS      Cueing 1  Verbal  -WS      Reps 1  3  -WS      Time 1  20s  -WS         Exercise 2    Exercise Name 2  SL bridge- figure 4  -WS      Cueing 2  Verbal  -WS      Reps 2  15  -WS      Time 2  5s  -WS         Exercise 3    Exercise Name 3  clamshell supine  -WS      Cueing 3  Verbal  -WS      Reps 3  20  -WS      Additional Comments  BTB  -WS         Exercise 4    Exercise Name  4  LAQ 10#  -WS      Reps 4  20  -WS         Exercise 5    Exercise Name 5  bike  -WS      Cueing 5  Verbal  -WS      Time 5  5 min  -WS         Exercise 7    Exercise Name 7  leg press  -WS      Cueing 7  Demo  -WS      Reps 7  20  -WS      Additional Comments  160#  -WS         Exercise 8    Exercise Name 8  TKE standing  -WS      Cueing 8  Demo  -WS      Reps 8  15  -WS      Additional Comments  Black TB  -WS         Exercise 9    Exercise Name 9  calf raise on steps  -WS      Reps 9  20  -WS         Exercise 10    Exercise Name 10  modified side plank  -WS      Cueing 10  Verbal  -WS      Reps 10  2  -WS      Time 10  20s  -WS         Exercise 11    Exercise Name 11  stair HS stretch  -WS      Cueing 11  Verbal;Demo  -WS      Reps 11  3  -WS      Time 11  20s  -WS         Exercise 12    Exercise Name 12  side steps with band  -WS      Cueing 12  Verbal  -WS      Reps 12  3 laps  -WS      Additional Comments  RTB ankle  -WS         Exercise 13    Exercise Name 13  gastroc stretch stairs  -WS      Cueing 13  Demo  -WS      Reps 13  3  -WS      Time 13  20s  -WS         Exercise 15    Exercise Name 15  standing HS curl   -WS      Reps 15  20  -WS      Additional Comments  5#  -WS        User Key  (r) = Recorded By, (t) = Taken By, (c) = Cosigned By    Initials Name Provider Type    Elder Au PTA Physical Therapy Assistant                       PT OP Goals     Row Name 10/07/19 1600          PT Short Term Goals    STG Date to Achieve  09/25/19  -WS     STG 1  The pt will demonstrate IND with initial HEP.  -WS     STG 1 Progress  Met  -WS     STG 2  The pt will resume reciprocal stair climbing without increased pain for improved home navigation.  -     STG 2 Progress  Met  -        Long Term Goals    LTG Date to Achieve  11/04/19  -WS     LTG 1  The pt will demonstrate IND with progressive HEP for IND condition management andd return to PLOF.  -WS     LTG 1 Progress  Ongoing  -WS     LTG 2  The pt  will squat/ kneel/crawl during a typical work day without increased pain for improved work performance.  -     LTG 2 Progress  Ongoing  -     LTG 3  The pt will demonstrate good understanding of proper body mechanics during crawling/lifting/climbing/squatting for return to work with safe mechanics.  -     LTG 3 Progress  Ongoing  -     LTG 4  The pt will score greater than or equal to 80% ability on the KOS to indicate improvevd perceived performance of ADLs.  -     LTG 4 Progress  Ongoing  -     LTG 5  The pt will demonstrate BLE and hip strength to 5/5 for improved squatting/ climbing perforance.  -     LTG 5 Progress  Ongoing  -       User Key  (r) = Recorded By, (t) = Taken By, (c) = Cosigned By    Initials Name Provider Type    Elder Au PTA Physical Therapy Assistant          Therapy Education  Given: HEP, Symptoms/condition management, Posture/body mechanics  Program: Reinforced  How Provided: Verbal  Provided to: Patient              Time Calculation:   Start Time: 1615  Stop Time: 1655  Time Calculation (min): 40 min  Therapy Charges for Today     Code Description Service Date Service Provider Modifiers Qty    06497970109  PT THER PROC EA 15 MIN 10/7/2019 Elder Lin PTA GP 3                    Elder Lin PTA  10/7/2019

## 2019-10-09 ENCOUNTER — HOSPITAL ENCOUNTER (OUTPATIENT)
Dept: PHYSICAL THERAPY | Facility: HOSPITAL | Age: 49
Setting detail: THERAPIES SERIES
Discharge: HOME | End: 2019-10-09

## 2019-10-09 DIAGNOSIS — M25.562 CHRONIC PAIN OF BOTH KNEES: Primary | ICD-10-CM

## 2019-10-09 DIAGNOSIS — M25.561 CHRONIC PAIN OF BOTH KNEES: Primary | ICD-10-CM

## 2019-10-09 DIAGNOSIS — G89.29 CHRONIC PAIN OF BOTH KNEES: Primary | ICD-10-CM

## 2019-10-09 PROCEDURE — 97110 THERAPEUTIC EXERCISES: CPT

## 2019-10-09 NOTE — THERAPY TREATMENT NOTE
Outpatient Physical Therapy Ortho Treatment Note  [facility]     Patient Name: Naveed Sauceda  : 1970  MRN: [id number]  Today's Date: 10/9/2019      Visit Date: 10/09/2019    Visit Dx:    ICD-10-CM ICD-9-CM   1. Chronic pain of both knees M25.561 719.46    M25.562 338.29    G89.29        Patient Active Problem List   Diagnosis   • Acid reflux   • Palpitations   • Shortness of breath   • RBBB        Past Medical History:   Diagnosis Date   • Acid reflux    • Allergic    • Ankylosing spondylitis of site in spine (CMS/[facility])    • Kidney stone    • Observed sleep apnea    • Osteoarthritis    • Palpitations    • Shortness of breath    • Tired         Past Surgical History:   Procedure Laterality Date   • CYSTOSCOPY W/ LASER LITHOTRIPSY                         PT Assessment/Plan     Row Name 10/09/19 1736          PT Assessment    Assessment Comments  Patient independent with HEP and symptom management. Patient discharged to strength train BIW and stretch daily  -WS       User Key  (r) = Recorded By, (t) = Taken By, (c) = Cosigned By    Initials Name Provider Type    Elder Au PTA Physical Therapy Assistant            OP Exercises     Row Name 10/09/19 1700             Subjective Comments    Subjective Comments  knees feeling stronger.   -WS         Subjective Pain    Able to rate subjective pain?  yes  -WS      Pre-Treatment Pain Level  0  -WS         Total Minutes    15721 - PT Therapeutic Exercise Minutes  40  -WS         Exercise 1    Exercise Name 1  piriformis stretch  -WS      Cueing 1  Verbal  -WS      Reps 1  3  -WS      Time 1  20s  -WS         Exercise 2    Exercise Name 2  SL bridge- figure 4  -WS      Cueing 2  Verbal  -WS      Reps 2  15  -WS      Time 2  5s  -WS         Exercise 3    Exercise Name 3  clamshell supine  -WS      Cueing 3  Verbal  -WS      Reps 3  20  -WS      Additional Comments  BTB  -WS         Exercise 4    Exercise Name 4  LAQ 10#  -WS      Reps 4  20  -WS          Exercise 5    Exercise Name 5  bike  -WS      Cueing 5  Verbal  -WS      Time 5  5 min  -WS         Exercise 7    Exercise Name 7  leg press  -WS      Cueing 7  Demo  -WS      Reps 7  20  -WS      Additional Comments  160#  -WS         Exercise 8    Exercise Name 8  TKE standing  -WS      Cueing 8  Demo  -WS      Reps 8  15  -WS      Additional Comments  Black TB B/uni  -WS         Exercise 9    Exercise Name 9  calf raise on steps  -WS      Reps 9  20  -WS         Exercise 10    Exercise Name 10  modified side plank  -WS      Cueing 10  Verbal  -WS      Reps 10  2  -WS      Time 10  20s  -WS         Exercise 11    Exercise Name 11  stair HS stretch  -WS      Cueing 11  Verbal;Demo  -WS      Reps 11  3  -WS      Time 11  20s  -WS         Exercise 12    Exercise Name 12  side steps with band  -WS      Cueing 12  Verbal  -WS      Reps 12  3 laps  -WS      Additional Comments  RTB ankle  -WS         Exercise 13    Exercise Name 13  gastroc stretch stairs  -WS      Cueing 13  Demo  -WS      Reps 13  3  -WS      Time 13  20s  -WS         Exercise 15    Exercise Name 15  standing HS curl   -WS      Reps 15  20  -WS      Additional Comments  5#  -WS        User Key  (r) = Recorded By, (t) = Taken By, (c) = Cosigned By    Initials Name Provider Type    Elder Au PTA Physical Therapy Assistant                       PT OP Goals     Row Name 10/09/19 1700          PT Short Term Goals    STG Date to Achieve  09/25/19  -WS     STG 1  The pt will demonstrate IND with initial HEP.  -WS     STG 1 Progress  Met  -WS     STG 2  The pt will resume reciprocal stair climbing without increased pain for improved home navigation.  -     STG 2 Progress  Met  -        Long Term Goals    LTG Date to Achieve  11/04/19  -WS     LTG 1  The pt will demonstrate IND with progressive HEP for IND condition management andd return to PLOF.  -WS     LTG 1 Progress  Met  -     LTG 2  The pt will squat/ kneel/crawl during a typical  work day without increased pain for improved work performance.  -     LTG 2 Progress  Met  -     LTG 3  The pt will demonstrate good understanding of proper body mechanics during crawling/lifting/climbing/squatting for return to work with safe mechanics.  -     LTG 3 Progress  Met  -     LTG 4  The pt will score greater than or equal to 80% ability on the KOS to indicate improvevd perceived performance of ADLs.  -     LTG 5  The pt will demonstrate BLE and hip strength to 5/5 for improved squatting/ climbing perforance.  -     LTG 5 Progress  Met  -       User Key  (r) = Recorded By, (t) = Taken By, (c) = Cosigned By    Initials Name Provider Type    Elder Au PTA Physical Therapy Assistant          Therapy Education  Given: HEP, Symptoms/condition management  Program: Reinforced  How Provided: Verbal  Provided to: Patient  Level of Understanding: Teach back education performed              Time Calculation:   Start Time: 1700  Stop Time: 1740  Time Calculation (min): 40 min  Therapy Charges for Today     Code Description Service Date Service Provider Modifiers Qty    69895128968 HC PT THER PROC EA 15 MIN 10/9/2019 Elder Lin PTA GP 3                    Elder Lin PTA  10/9/2019

## 2019-11-15 ENCOUNTER — DOCUMENTATION (OUTPATIENT)
Dept: PHYSICAL THERAPY | Facility: HOSPITAL | Age: 49
End: 2019-11-15

## 2019-11-15 DIAGNOSIS — G89.29 CHRONIC PAIN OF BOTH KNEES: Primary | ICD-10-CM

## 2019-11-15 DIAGNOSIS — M25.561 CHRONIC PAIN OF BOTH KNEES: Primary | ICD-10-CM

## 2019-11-15 DIAGNOSIS — M25.562 CHRONIC PAIN OF BOTH KNEES: Primary | ICD-10-CM

## 2019-11-15 NOTE — THERAPY DISCHARGE NOTE
Outpatient Physical Therapy Ortho Discharge Summary       Patient Name: Naveed Sauceda  : 1970  MRN: [id number]  Today's Date: 11/15/2019      Visit Date: 11/15/2019    Visit Dx:    ICD-10-CM ICD-9-CM   1. Chronic pain of both knees M25.561 719.46    M25.562 338.29    G89.29        Patient Active Problem List   Diagnosis   • Acid reflux   • Palpitations   • Shortness of breath   • RBBB        Past Medical History:   Diagnosis Date   • Acid reflux    • Allergic    • Ankylosing spondylitis of site in spine (CMS/[facility])    • Kidney stone    • Observed sleep apnea    • Osteoarthritis    • Palpitations    • Shortness of breath    • Tired         Past Surgical History:   Procedure Laterality Date   • CYSTOSCOPY W/ LASER LITHOTRIPSY           PT OP Goals     Row Name 11/15/19 1000          PT Short Term Goals    STG Date to Achieve  19  -     STG 1  The pt will demonstrate IND with initial HEP.  -     STG 1 Progress  Met  -     STG 2  The pt will resume reciprocal stair climbing without increased pain for improved home navigation.  -     STG 2 Progress  Met  [facility]        Long Term Goals    LTG Date to Achieve  19  -     LTG 1  The pt will demonstrate IND with progressive HEP for IND condition management andd return to PLOF.  -     LTG 1 Progress  Met  [facility]     LTG 2  The pt will squat/ kneel/crawl during a typical work day without increased pain for improved work performance.  -     LTG 2 Progress  Met  [facility]     LTG 3  The pt will demonstrate good understanding of proper body mechanics during crawling/lifting/climbing/squatting for return to work with safe mechanics.  -     LTG 3 Progress  Met  [facility]     LTG 4  The pt will score greater than or equal to 80% ability on the KOS to indicate improvevd perceived performance of ADLs.  -     LTG 5  The pt will demonstrate BLE and hip strength to 5/5 for improved squatting/ climbing perforance.  -     LTG 5 Progress  Met  -[facility]       User Key   (r) = Recorded By, (t) = Taken By, (c) = Cosigned By    Initials Name Provider Type    Shae Lakhani, PT Physical Therapist                         Time Calculation:                OP PT Discharge Summary  Date of Discharge: 11/15/19  Reason for Discharge: All goals achieved  Outcomes Achieved: Able to achieve all goals within established timeline  Discharge Destination: Home with home program  Discharge Instructions/Additional Comments: Pt has been DC due to meeting all stated goals. Home w/ ind HEP      Shae Marrero, PT  11/15/2019

## 2020-07-16 NOTE — PROGRESS NOTES
"[facility] PULMONARY CARE         Dr Alyse Garcia  [unfilled]  Patient Care Team:  Anjana Palencia PA-C as PCP - General (Family Medicine)    Chief Complaint: Fatigue and follow-up post home sleep study    Interval History: Patient here follow-up post home sleep study.  Patient had discussed his of further management options.  Remains fatigued and sleepy and generally falls asleep in the evenings after meals.  Bedtime 10 PM awake time 5 AM.  Gets about 6-1/2 hours of sleep.  No tobacco no alcohol or caffeine abuse.  No symptoms of cataplexy or hallucinations at bedtime.    REVIEW OF SYSTEMS:   CARDIOVASCULAR: No chest pain, chest pressure or chest discomfort. No palpitations or edema.   RESPIRATORY: No shortness of breath, cough or sputum.   GASTROINTESTINAL: No anorexia, nausea, vomiting or diarrhea. No abdominal pain or blood.   HEMATOLOGIC: No bleeding or bruising.   Pettibone remains high at 13 out of 24    Ventilator/Non-Invasive Ventilation Settings     None            Vital Signs  Heart Rate:  [77] 77  BP: (121)/(73) 121/73  [unfilled]  Flowsheet Rows    Flowsheet Row First Filed Value   Admission Height 180.3 cm (71\") Documented at 04/09/2018 1612   Admission Weight 76.7 kg (169 lb) Documented at 04/09/2018 1612          Physical Exam:   General Appearance:    Alert, cooperative, in no acute distress  ENT Mallampati between 3 and 4    Lungs:     Clear to auscultation,respirations regular, even and                  unlabored    Heart:    Regular rhythm and normal rate, normal S1 and S2, no            murmur, no gallop, no rub, no click   Chest Wall:    No abnormalities observed   Abdomen:     Normal bowel sounds, no masses, no organomegaly, soft        non-tender, non-distended, no guarding, no rebound                tenderness   Extremities:   Moves all extremities well, no edema, no cyanosis, no             redness     Results Review:                                          I reviewed the patient's new " clinical results.  I personally viewed and interpreted the patient's CXR        Medication Review:         No current facility-administered medications for this visit.     ASSESSMENT:   Hypersomnia persistent  Sleep study negative for ANTHONY  GERD      PLAN:  Reviewed home sleep study result in detail to the patient  No significant ANTHONY overall with mild positional ANTHONY noted  No significant sleep-related hypoxemia noted during the study  Advised patient to increase sleep time but 30 minutes or more  Avoidance of supine positioning  Exercise  Sleep hygiene measures  We will follow-up in 6 months and if symptoms are persistent with high [place] consider MSL T evaluate for narcolepsy.  Alyse Garcia MD  04/09/18  4:30 PM         Detail Level: Zone

## 2021-03-30 ENCOUNTER — BULK ORDERING (OUTPATIENT)
Dept: CASE MANAGEMENT | Facility: OTHER | Age: 51
End: 2021-03-30

## 2021-03-30 DIAGNOSIS — Z23 IMMUNIZATION DUE: ICD-10-CM

## 2022-05-05 ENCOUNTER — OFFICE VISIT (OUTPATIENT)
Dept: FAMILY MEDICINE CLINIC | Facility: CLINIC | Age: 52
End: 2022-05-05

## 2022-05-05 VITALS
TEMPERATURE: 97.5 F | OXYGEN SATURATION: 100 % | RESPIRATION RATE: 16 BRPM | HEIGHT: 71 IN | WEIGHT: 172 LBS | BODY MASS INDEX: 24.08 KG/M2 | HEART RATE: 65 BPM | DIASTOLIC BLOOD PRESSURE: 65 MMHG | SYSTOLIC BLOOD PRESSURE: 110 MMHG

## 2022-05-05 DIAGNOSIS — Z12.11 SCREEN FOR COLON CANCER: ICD-10-CM

## 2022-05-05 DIAGNOSIS — Z12.5 SCREENING PSA (PROSTATE SPECIFIC ANTIGEN): ICD-10-CM

## 2022-05-05 DIAGNOSIS — M25.532 LEFT WRIST PAIN: ICD-10-CM

## 2022-05-05 DIAGNOSIS — E55.9 VITAMIN D DEFICIENCY: ICD-10-CM

## 2022-05-05 DIAGNOSIS — M65.4 DE QUERVAIN'S DISEASE (TENOSYNOVITIS): Primary | ICD-10-CM

## 2022-05-05 DIAGNOSIS — Z00.00 LABORATORY EXAMINATION ORDERED AS PART OF A ROUTINE GENERAL MEDICAL EXAMINATION: ICD-10-CM

## 2022-05-05 PROCEDURE — 99203 OFFICE O/P NEW LOW 30 MIN: CPT | Performed by: PHYSICIAN ASSISTANT

## 2022-05-05 PROCEDURE — 73110 X-RAY EXAM OF WRIST: CPT | Performed by: PHYSICIAN ASSISTANT

## 2022-05-05 RX ORDER — MELOXICAM 15 MG/1
15 TABLET ORAL DAILY
Qty: 30 TABLET | Refills: 2 | Status: SHIPPED | OUTPATIENT
Start: 2022-05-05

## 2022-05-05 NOTE — PROGRESS NOTES
"Subjective   Naveed Sauceda is a 52 y.o. male.     History of Present Illness   Naveed Sauceda 52 y.o. male /65 (BP Location: Left arm, Patient Position: Sitting, Cuff Size: Adult)   Pulse 65   Temp 97.5 °F (36.4 °C)   Resp 16   Ht 180.3 cm (70.98\")   Wt 78 kg (172 lb)   SpO2 100%   BMI 24.00 kg/m²  who presents today for left wrist pain and snapping.   he has a history of   Patient Active Problem List   Diagnosis   • Acid reflux   • Palpitations   • Shortness of breath   • RBBB   .      Left Wrist pain---onset last June====did bump it then; pain since  Has pain wrist base right thumb and has snapping in last week with ROM thumb  Pain is chronic burn and sharp with wrist and thumb ROM.  Aleve not much help  No prior tx. No brace tried.  No prior surgery    Was worked up ANTHONY--positional only  Still has palpitations and saw cardio----noted RBBB  Pain to do push ups. All ROM painful--more with use of thumb  X-Ray  Interpretation report in house X-rays that I personally viewed    Relevant Clinical Issues/Diagnoses/Indications: Chronic left wrist pain initial injury June        Clinical Findings: Wrist without fracture and no bone erosion or other bony abnormality.  Normal alignment          Comparative Data:  none          Date of Previous X-ray:    Change on current X-ray:        The following portions of the patient's history were reviewed and updated as appropriate: allergies, current medications, past family history, past medical history, past social history, past surgical history and problem list.    Review of Systems   Constitutional: Positive for activity change. Negative for appetite change and unexpected weight change.   HENT: Negative for nosebleeds and trouble swallowing.    Eyes: Negative for pain and visual disturbance.   Respiratory: Negative for chest tightness, shortness of breath and wheezing.    Cardiovascular: Negative for chest pain and palpitations.   Gastrointestinal: Negative for " abdominal pain and blood in stool.   Endocrine: Negative.    Genitourinary: Negative for difficulty urinating and hematuria.   Musculoskeletal: Positive for arthralgias. Negative for joint swelling.   Skin: Negative for color change and rash.   Allergic/Immunologic: Negative.    Neurological: Negative for syncope and speech difficulty.   Hematological: Negative for adenopathy.   Psychiatric/Behavioral: Negative for agitation and confusion.   All other systems reviewed and are negative.      Objective   Physical Exam  Vitals and nursing note reviewed.   Constitutional:       General: He is not in acute distress.     Appearance: Normal appearance. He is well-developed. He is not diaphoretic.   HENT:      Head: Normocephalic.   Eyes:      Conjunctiva/sclera: Conjunctivae normal.      Pupils: Pupils are equal, round, and reactive to light.   Cardiovascular:      Rate and Rhythm: Normal rate and regular rhythm.      Heart sounds: Normal heart sounds. No murmur heard.  Musculoskeletal:         General: No swelling, tenderness or deformity. Normal range of motion.      Cervical back: Normal range of motion and neck supple.      Comments: Pain with wrist ROM and thumb ROM;  + Finkelstein test   Skin:     General: Skin is warm and dry.      Findings: No rash.   Neurological:      Mental Status: He is alert and oriented to person, place, and time.      Sensory: No sensory deficit.      Motor: No weakness.      Coordination: Coordination normal.   Psychiatric:         Mood and Affect: Affect is not inappropriate.         Behavior: Behavior normal.         Thought Content: Thought content normal.         Judgment: Judgment normal.         Assessment/Plan   Diagnoses and all orders for this visit:    1. De Quervain's disease (tenosynovitis) (Primary)  -     Comprehensive metabolic panel  -     Lipid panel  -     CBC and Differential  -     TSH  -     T3, Free  -     T4, Free  -     Vitamin D 25 Hydroxy  -     Vitamin B12  -      Folate  -     Urinalysis With Microscopic - Urine, Clean Catch  -     PSA Screen  -     Ambulatory Referral to Hand Surgery    2. Vitamin D deficiency  -     Comprehensive metabolic panel  -     Lipid panel  -     CBC and Differential  -     TSH  -     T3, Free  -     T4, Free  -     Vitamin D 25 Hydroxy  -     Vitamin B12  -     Folate  -     Urinalysis With Microscopic - Urine, Clean Catch  -     PSA Screen  -     Ambulatory Referral to Hand Surgery    3. Laboratory examination ordered as part of a routine general medical examination  -     Comprehensive metabolic panel  -     Lipid panel  -     CBC and Differential  -     TSH  -     T3, Free  -     T4, Free  -     Vitamin D 25 Hydroxy  -     Vitamin B12  -     Folate  -     Urinalysis With Microscopic - Urine, Clean Catch  -     PSA Screen  -     Ambulatory Referral to Hand Surgery    4. Screening PSA (prostate specific antigen)  -     Comprehensive metabolic panel  -     Lipid panel  -     CBC and Differential  -     TSH  -     T3, Free  -     T4, Free  -     Vitamin D 25 Hydroxy  -     Vitamin B12  -     Folate  -     Urinalysis With Microscopic - Urine, Clean Catch  -     PSA Screen  -     Ambulatory Referral to Hand Surgery    5. Screen for colon cancer  -     Comprehensive metabolic panel  -     Lipid panel  -     CBC and Differential  -     TSH  -     T3, Free  -     T4, Free  -     Vitamin D 25 Hydroxy  -     Vitamin B12  -     Folate  -     Urinalysis With Microscopic - Urine, Clean Catch  -     PSA Screen  -     Ambulatory Referral to Hand Surgery  -     Cologuard - Stool, Per Rectum; Future    Other orders  -     Cancel: Tdap Vaccine Greater Than or Equal To 6yo IM  -     meloxicam (Mobic) 15 MG tablet; Take 1 tablet by mouth Daily. For pain with food (stop if GI upset)  Dispense: 30 tablet; Refill: 2      Out of Tdap  Concern De Querlisette's ---try thumb spica splint ----try Mobic  Labs --  Try also Mobic --  Long term use of NSAIDs can lead to heart  disease and strokes, as well as GI bleeding/ulcers, and cause kidney disease. Advise labs to monitor renal functions to be done at least every 6 months.

## 2022-05-06 LAB
25(OH)D3+25(OH)D2 SERPL-MCNC: 49.3 NG/ML (ref 30–100)
ALBUMIN SERPL-MCNC: 4.3 G/DL (ref 3.8–4.9)
ALBUMIN/GLOB SERPL: 1.9 {RATIO} (ref 1.2–2.2)
ALP SERPL-CCNC: 88 IU/L (ref 44–121)
ALT SERPL-CCNC: 24 IU/L (ref 0–44)
APPEARANCE UR: ABNORMAL
AST SERPL-CCNC: 17 IU/L (ref 0–40)
BACTERIA #/AREA URNS HPF: NORMAL /[HPF]
BASOPHILS # BLD AUTO: 0.1 X10E3/UL (ref 0–0.2)
BASOPHILS NFR BLD AUTO: 1 %
BILIRUB SERPL-MCNC: 0.3 MG/DL (ref 0–1.2)
BILIRUB UR QL STRIP: NEGATIVE
BUN SERPL-MCNC: 18 MG/DL (ref 6–24)
BUN/CREAT SERPL: 19 (ref 9–20)
CALCIUM SERPL-MCNC: 9.5 MG/DL (ref 8.7–10.2)
CASTS URNS QL MICRO: NORMAL /LPF
CHLORIDE SERPL-SCNC: 107 MMOL/L (ref 96–106)
CHOLEST SERPL-MCNC: 156 MG/DL (ref 100–199)
CO2 SERPL-SCNC: 23 MMOL/L (ref 20–29)
COLOR UR: YELLOW
CREAT SERPL-MCNC: 0.97 MG/DL (ref 0.76–1.27)
EGFRCR SERPLBLD CKD-EPI 2021: 94 ML/MIN/1.73
EOSINOPHIL # BLD AUTO: 0.1 X10E3/UL (ref 0–0.4)
EOSINOPHIL NFR BLD AUTO: 2 %
EPI CELLS #/AREA URNS HPF: NORMAL /HPF (ref 0–10)
ERYTHROCYTE [DISTWIDTH] IN BLOOD BY AUTOMATED COUNT: 12.7 % (ref 11.6–15.4)
FOLATE SERPL-MCNC: 18.6 NG/ML
GLOBULIN SER CALC-MCNC: 2.3 G/DL (ref 1.5–4.5)
GLUCOSE SERPL-MCNC: 76 MG/DL (ref 65–99)
GLUCOSE UR QL STRIP: NEGATIVE
HCT VFR BLD AUTO: 49.2 % (ref 37.5–51)
HDLC SERPL-MCNC: 46 MG/DL
HGB BLD-MCNC: 17.4 G/DL (ref 13–17.7)
HGB UR QL STRIP: NEGATIVE
IMM GRANULOCYTES # BLD AUTO: 0 X10E3/UL (ref 0–0.1)
IMM GRANULOCYTES NFR BLD AUTO: 0 %
KETONES UR QL STRIP: NEGATIVE
LDLC SERPL CALC-MCNC: 93 MG/DL (ref 0–99)
LEUKOCYTE ESTERASE UR QL STRIP: NEGATIVE
LYMPHOCYTES # BLD AUTO: 1.6 X10E3/UL (ref 0.7–3.1)
LYMPHOCYTES NFR BLD AUTO: 25 %
MCH RBC QN AUTO: 31.8 PG (ref 26.6–33)
MCHC RBC AUTO-ENTMCNC: 35.4 G/DL (ref 31.5–35.7)
MCV RBC AUTO: 90 FL (ref 79–97)
MICRO URNS: ABNORMAL
MICRO URNS: ABNORMAL
MONOCYTES # BLD AUTO: 0.8 X10E3/UL (ref 0.1–0.9)
MONOCYTES NFR BLD AUTO: 12 %
NEUTROPHILS # BLD AUTO: 3.9 X10E3/UL (ref 1.4–7)
NEUTROPHILS NFR BLD AUTO: 60 %
NITRITE UR QL STRIP: NEGATIVE
PH UR STRIP: 7 [PH] (ref 5–7.5)
PLATELET # BLD AUTO: 230 X10E3/UL (ref 150–450)
POTASSIUM SERPL-SCNC: 4.7 MMOL/L (ref 3.5–5.2)
PROT SERPL-MCNC: 6.6 G/DL (ref 6–8.5)
PROT UR QL STRIP: NEGATIVE
PSA SERPL-MCNC: 0.8 NG/ML (ref 0–4)
RBC # BLD AUTO: 5.48 X10E6/UL (ref 4.14–5.8)
RBC #/AREA URNS HPF: NORMAL /HPF (ref 0–2)
SODIUM SERPL-SCNC: 144 MMOL/L (ref 134–144)
SP GR UR STRIP: 1.02 (ref 1–1.03)
T3FREE SERPL-MCNC: 3.3 PG/ML (ref 2–4.4)
T4 FREE SERPL-MCNC: 1.17 NG/DL (ref 0.82–1.77)
TRIGL SERPL-MCNC: 92 MG/DL (ref 0–149)
TSH SERPL DL<=0.005 MIU/L-ACNC: 0.67 UIU/ML (ref 0.45–4.5)
UROBILINOGEN UR STRIP-MCNC: 0.2 MG/DL (ref 0.2–1)
VIT B12 SERPL-MCNC: 970 PG/ML (ref 232–1245)
VLDLC SERPL CALC-MCNC: 17 MG/DL (ref 5–40)
WBC # BLD AUTO: 6.4 X10E3/UL (ref 3.4–10.8)
WBC #/AREA URNS HPF: NORMAL /HPF (ref 0–5)

## 2025-08-21 ENCOUNTER — OFFICE VISIT (OUTPATIENT)
Dept: FAMILY MEDICINE CLINIC | Facility: CLINIC | Age: 55
End: 2025-08-21
Payer: COMMERCIAL

## 2025-08-21 ENCOUNTER — RESULTS FOLLOW-UP (OUTPATIENT)
Dept: FAMILY MEDICINE CLINIC | Facility: CLINIC | Age: 55
End: 2025-08-21

## 2025-08-21 VITALS
SYSTOLIC BLOOD PRESSURE: 112 MMHG | DIASTOLIC BLOOD PRESSURE: 70 MMHG | HEART RATE: 77 BPM | OXYGEN SATURATION: 99 % | TEMPERATURE: 95.6 F | WEIGHT: 172.2 LBS | BODY MASS INDEX: 24.11 KG/M2 | HEIGHT: 71 IN

## 2025-08-21 DIAGNOSIS — M79.672 INTRACTABLE LEFT HEEL PAIN: Primary | ICD-10-CM
